# Patient Record
Sex: FEMALE | URBAN - METROPOLITAN AREA
[De-identification: names, ages, dates, MRNs, and addresses within clinical notes are randomized per-mention and may not be internally consistent; named-entity substitution may affect disease eponyms.]

---

## 2019-09-24 ENCOUNTER — EMERGENCY (EMERGENCY)
Facility: HOSPITAL | Age: 24
LOS: 1 days | Discharge: HOME | End: 2019-09-24
Attending: EMERGENCY MEDICINE | Admitting: EMERGENCY MEDICINE
Payer: MEDICAID

## 2019-09-24 VITALS
WEIGHT: 125 LBS | OXYGEN SATURATION: 95 % | HEIGHT: 66 IN | SYSTOLIC BLOOD PRESSURE: 103 MMHG | DIASTOLIC BLOOD PRESSURE: 72 MMHG | HEART RATE: 86 BPM | TEMPERATURE: 99 F | RESPIRATION RATE: 18 BRPM

## 2019-09-24 PROCEDURE — 99283 EMERGENCY DEPT VISIT LOW MDM: CPT

## 2019-09-24 RX ORDER — IBUPROFEN 200 MG
1 TABLET ORAL
Qty: 28 | Refills: 0
Start: 2019-09-24 | End: 2019-09-30

## 2019-09-24 NOTE — ED PROVIDER NOTE - OBJECTIVE STATEMENT
Intermittent R axillary pain for the past 6 days.  Worse at night, mild, intermittent.  Denies radiation of pain.  Denies arm pain, breast pain, fever or chills.  Denies hx of breast cancer in the family.  States that she felt a lump earlier but it is no longer present and she currently does not have any pain.  Accompanied by mom and sister.  Endorses GYN doctor whom she has close follow up with.  Has not taken any medication for pain prior to arrival in ED

## 2019-09-24 NOTE — ED PROVIDER NOTE - PATIENT PORTAL LINK FT
You can access the FollowMyHealth Patient Portal offered by [facility] by registering at the following website: [email or web address]. By joining [employer]’s FollowMyHealth portal, you will also be able to view your health information using other applications (apps) compatible with our system.

## 2019-09-24 NOTE — ED PROVIDER NOTE - NSFOLLOWUPINSTRUCTIONS_ED_ALL_ED_FT
Take motrin/advil 400 mg as needed for pain    If the area becomes more swollen, tender or red please start antibiotics that were prescribed to you    If symptoms persist please follow yup with your GYN doctor or PCP.

## 2019-09-24 NOTE — ED PROVIDER NOTE - CLINICAL SUMMARY MEDICAL DECISION MAKING FREE TEXT BOX
Intermittent R axillary pain for the past 6 days.  No pain present.  No visible or palpable abnormal skin areas on exam.  Denies breast pain or constitutional symptoms.  Denies hx of breast cancer.  Prescribed medication or pain and antibiotics if the symptoms worsen.  No role for imaging or blood work at this time.  Due to episodic nature of the symptoms unlikely lymphadenitis or abscess.  Conservative management discussed with the patient in detail.  Close PMD/GYN follow up encouraged.  Strict ED return instructions discussed in detail and patient given the opportunity to ask any questions about their discharge diagnosis and instructions

## 2019-09-24 NOTE — ED ADULT TRIAGE NOTE - CHIEF COMPLAINT QUOTE
Pt presents to ED with c/o pain/induration to right axilla, denies fevers, no streaking or purulent d/c

## 2019-09-24 NOTE — ED PROVIDER NOTE - PHYSICAL EXAMINATION
R axillary area - short cut hear present.  No erythema or visible areas of abnormal skin.  No TTP or palpable areas present

## 2019-10-01 DIAGNOSIS — M79.621 PAIN IN RIGHT UPPER ARM: ICD-10-CM

## 2020-08-03 ENCOUNTER — EMERGENCY (EMERGENCY)
Facility: HOSPITAL | Age: 25
LOS: 1 days | Discharge: HOME | End: 2020-08-03
Attending: EMERGENCY MEDICINE | Admitting: EMERGENCY MEDICINE
Payer: MEDICAID

## 2020-08-03 VITALS
DIASTOLIC BLOOD PRESSURE: 61 MMHG | OXYGEN SATURATION: 97 % | WEIGHT: 139.99 LBS | SYSTOLIC BLOOD PRESSURE: 95 MMHG | TEMPERATURE: 98 F | HEART RATE: 76 BPM | RESPIRATION RATE: 18 BRPM

## 2020-08-03 PROCEDURE — 99284 EMERGENCY DEPT VISIT MOD MDM: CPT

## 2020-08-03 NOTE — ED ADULT TRIAGE NOTE - CHIEF COMPLAINT QUOTE
states she had a dry cough, nasal congestion on friday- is here for covid test, pt with no medical complaints at this time

## 2020-08-03 NOTE — ED PROVIDER NOTE - OBJECTIVE STATEMENT
23 y/o female presents to the ED with complaints of dry cough and nasal congestion x 3 days. Patient is requesting COVID-19 testing. No other medical complaints at this time. Denies fever or chills. 23 y/o female presents to the ED with complaints of dry cough, mild headache, body aches, and nasal congestion x 3 days. Endorses having seasonal allergies but current symptoms are different from past episodes. Patient is requesting COVID-19 testing. No other medical complaints at this time. Denies any sick contacts. Patient works as a  [place] Denies fever or chills.

## 2020-08-03 NOTE — ED PROVIDER NOTE - NSFOLLOWUPINSTRUCTIONS_ED_ALL_ED_FT
Prevención de infecciones en el hogar  Infection Prevention in the Home  Si tiene claudette infección, puede haberse expuesto a claudette infección o está cuidando a alguien que tiene claudette infección, es importante saber cómo evitar que la infección se propague. Siga las instrucciones del médico y cheo uso de estas pautas para evitar la propagación de la infección.  Cómo se propagan las infecciones  Para que claudette infección se propague, debe existir lo siguiente:  Un germen. Tashua puede ser un virus, claudette bacteria, un hongo o un parásito.Un lugar donde viva el germen. Tashua puede ser lo siguiente:  En claudette persona, animal, planta o alimento.En el suelo o en el agua.En superficies cameron la manija de claudette mony.Claudette persona o animal que pueda desarrollar claudette enfermedad si el germen ingresa en mcleod cuerpo (anfitrión). El anfitrión no tiene resistencia al germen.Claudette manera de que el germen ingrese al anfitrión. Tashua puede ocurrir de las siguientes maneras:  Por contacto directo con claudette persona o animal infectado. Tashua puede suceder al darse la mano o abrazarse. Algunos gérmenes también pueden desplazarse a través del aire y propagarse a otras personas. Tashua puede ocurrir cuando claudette persona infectada tose o estornuda sobre o cerca de otras personas.Contacto indirecto. Tashua sucede cuando el germen ingresa en el anfitrión a través del contacto con un objeto infectado. Por ejemplo:  Ingerir o beber alimentos o agua que tengan el germen (estén contaminados).Al tocar claudette superficie contaminada con las elliot y luego llevarse la mano a la gloria, la boca, la nariz o los ojos.Materiales necesarios:  Jabón.Desinfectante para elliot a base de alcohol.Productos de limpieza estándar.Desinfectantes, cameron lejía.Toallas de papel o paños de limpieza o esponjas reutilizables.Guantes de trabajo desechables o reutilizables. Cómo evitar que la infección se propague  Hay varias cosas que puede hacer para prevenir la propagación de la infección.  [place] estas medidas generales     Todas las personas deben clarice las siguientes medidas para evitar la propagación de la infección:  Lávese las elliot regularmente con agua y jabón joyce al menos 20 segundos. Use desinfectante para elliot con alcohol si no dispone de agua y jabón.Evitar tocarse la gloria, la boca, la nariz y los ojos.Tosa o estornude en un pañuelo de papel o en mcleod manga o codo en lugar de hacerlo en la mano o en el aire.  Si tose o estornuda en un pañuelo de papel, deséchelo inmediatamente y lávese las elliot.Mantenga el baño limpio     Ponga jabón.Cambiar las toallas y las toallitas de mano con frecuencia.Cambiar los cepillos de dientes a menudo y guardarlos por separado en un lugar limpio y seco.Limpie y desinfecte todas las superficies; entre ellas, el inodoro, el piso, la bañera, la ducha y el lavabo.No comparta elementos personales, cameron afeitadoras, cepillos de dientes, desodorantes, peines, cepillos, toallas y toallitas de mano.Mantenga la higiene en la cocina        Lávese las elliot antes y después de preparar los alimentos y antes de comer. Limpiar el interior del refrigerador todas las semanas. Mantener el refrigerador en 40 °F (4 °C) o menos y el congelador a claudette temperatura de 0 °F (–18 °C) o menos. Mantenga las superficies de trabajo limpias. Desinféctelas periódicamente.Gualberto la vajilla con [place] y jabonosa. Secar la vajilla al aire o usar un lavavajillas. No comparta platos ni utensilios para comer.Manipule los alimentos de manera gusman.     Almacene los alimentos cuidadosamente. Refrigere las sobras de inmediato en recipientes con tapa. Tire alimentos rancios o en mal estado. Descongele los alimentos en el refrigerador o el microondas, no a temperatura ambiente. Sirva los alimentos a la temperatura adecuada. No coma carne cruda. Asegúrese de cocinar la carne a la temperatura adecuada. Cocine los huevos hasta que estén firmes. Lave las frutas y las verduras debajo del agua corriente. Use tablas de hieu, platos y utensilios distintos para alimentos crudos y alimentos cocidos. Use claudette cuchara limpia cada vez que prueba la comida mientras cocina.Lave la ropa de la manera correcta     Use guantes si la ropa está visiblemente sucia.No sacuda la ropa sucia. Hacerlo puede esparcir los gérmenes por el aire.Lave la ropa con [place].Si no puede gualberto la ropa de inmediato, colóquela en claudette bolsa plástica y lávela lo antes posible.Tenga cuidado con los animales y mascotas.     Lávese las elliot antes y después de tocar animales.Si tiene claudette mascota, asegúrese de mantenerla limpia. No permita que personas con un sistema inmunitario débil toquen excremento de pájaros, el agua de la pecera ni bandejas sanitarias.  Si tiene claudette jaula para mascotas o claudette caja de arena, asegúrese de limpiarla todos los días.Si está enfermo, manténgase alejado de los animales y pídale a otra persona que los cuide si es posible.Cómo limpiar y desinfectar objetos y superficies  Precauciones     Algunos desinfectantes funcionan con ciertos gérmenes y no con otros. Nohemy las instrucciones del fabricante o nohemy los recursos en línea para determinar si el producto que está usando funcionará con el germen que usted trata de eliminar.Si elige emplear lejía, úsela de manera gusman. Nunca los mezcle con otros productos de limpieza, en especial, si contienen amoníaco. Esta mezcla puede producir un gas peligroso que puede ser mortal.Mantenga un movimiento adecuado de aire fresco en mcleod casa (ventilación).Vierta el agua que usó para trapear en el lavabo de servicio o en el inodoro. No vierta song agua en el fregadero de la cocina.Objetos y superficies        Si las superficies están visiblemente sucias, límpielas alison con agua y jabón antes de desinfectarlas.Desinfecte las superficies que se tocan con frecuencia todos los días. Pueden incluir:  Encimeras.Mesas.Picaportes.Lavabos, fregaderos y grifos.Dispositivos electrónicos, cameron:  Teléfonos.Controles remotos.Teclados.Computadoras y tablets.Suministros de limpieza     Algunos suministros de limpieza pueden cultivar gérmenes. Cuídelos colleen para evitar que los gérmenes se propaguen. Para hacer esto:  Remoje cepillos de inodoro, trapeadores y esponjas en lejía y agua joyce 5 minutos después de cada uso, o según las instrucciones del fabricante.Lave los paños de limpieza reutilizables y desinfecte las esponjas después de cada uso.Deseche los guantes desechables después de un uso.Reemplace los guantes reutilizables si están rotos o rasgados o si se empiezan a pelar.Medidas adicionales si está enfermo  Si vive con otras personas:        Evite el contacto cercano con las personas que lo rodean. Permanezca a claudette distancia de al menos 3 pies (1 m) de las otras personas, si es posible.Use un baño aparte, si es posible. De ser posible, duerma en un dormitorio aparte o en claudette cama aparte para evitar infectar a otros miembros de la lindsey.  Cambiar la ropa jatin de los dormitorios todas las semanas o cuando esté sucia.Cheo que todos los integrantes del hogar se laven las elliot con agua y jabón con frecuencia. Use desinfectante para elliot con alcohol si no dispone de agua y jabón.En general:     Quédese en mcleod casa, excepto para obtener atención médica. Llame con anticipación antes de visitar al médico.Pídales a otras personas que le jaime la compra del supermercado y los suministros para el hogar, y que surtan las recetas de farida medicamentos.Evite las zonas públicas. Trate de no viajar en transporte público.Si puede, use claudette mascarilla si debe salir de la casa o si está en contacto cercano con alguien que no está enfermo.No reciba visitas hasta que se haya recuperado completamente o hasta que no tenga signos ni síntomas de infección.Evite preparar alimentos o cuidar a otras personas. Si debe preparar alimentos o cuidar a otras personas, use claudette mascarilla y lávese las elliot antes y después de hacer estas cosas.Dónde buscar más información  Centers for Disease Control and Prevention (Centros para el Control y la Prevención de Enfermedades): www.cdc.gov/nonpharmaceutical-interventions/index.html.Organización Mundial de la Theodora (OMS): www.who.int/infection-prevention/about/en/Association for Professionals in Infection Control and Epidemiology (Asociación de profesionales del control de infecciones y epidemiología): professionals.site.apic.org/settings-of-care/non-healthcare-setting/home/Resumen  Es importante saber cómo evitar que la infección se propague.Asegúrese de que en mcleod hogar todos se laven las elliot con agua y jabón con frecuencia.Desinfecte las superficies que se tocan con frecuencia todos los días.Si está enfermo, quédese en mcleod casa, excepto para obtener atención médica.Esta información no tiene cameron fin reemplazar el consejo del médico. Asegúrese de hacerle al médico cualquier pregunta que tenga.    Document Released: 11/30/2009 Document Revised: 03/30/2020 Document Reviewed: 03/30/2020  Elsevier Patient Education © 2020 Elsevier Inc.

## 2020-08-03 NOTE — ED PROVIDER NOTE - PATIENT PORTAL LINK FT
[address] [facility]  Brief Op Note     Shawanda Jacobo Location: OR   [facility] [id number] MRN [id number]   Admission Date 3/30/2017 Operation Date 3/30/2017   Attending Physician Sandra Becker., Valdemar Higginbotham DDS Operating Physician Sr Nikky Hartman DDS You can access the FollowMyHealth Patient Portal offered by [facility] by registering at the following website: [email or web address]. By joining [employer]’s FollowMyHealth portal, you will also be able to view your health information using other applications (apps) compatible with our system.

## 2020-08-04 LAB — SARS-COV-2 RNA SPEC QL NAA+PROBE: SIGNIFICANT CHANGE UP

## 2020-08-05 ENCOUNTER — TRANSCRIPTION ENCOUNTER (OUTPATIENT)
Age: 25
End: 2020-08-05

## 2020-08-07 DIAGNOSIS — B34.9 VIRAL INFECTION, UNSPECIFIED: ICD-10-CM

## 2020-08-07 DIAGNOSIS — R05 COUGH: ICD-10-CM

## 2020-08-07 DIAGNOSIS — Z20.828 CONTACT WITH AND (SUSPECTED) EXPOSURE TO OTHER VIRAL COMMUNICABLE DISEASES: ICD-10-CM

## 2020-11-08 ENCOUNTER — EMERGENCY (EMERGENCY)
Facility: HOSPITAL | Age: 25
LOS: 1 days | Discharge: HOME | End: 2020-11-08
Attending: EMERGENCY MEDICINE | Admitting: EMERGENCY MEDICINE
Payer: MEDICAID

## 2020-11-08 VITALS
WEIGHT: 145.06 LBS | OXYGEN SATURATION: 99 % | HEART RATE: 71 BPM | TEMPERATURE: 98 F | RESPIRATION RATE: 16 BRPM | DIASTOLIC BLOOD PRESSURE: 66 MMHG | SYSTOLIC BLOOD PRESSURE: 106 MMHG | HEIGHT: 66 IN

## 2020-11-08 DIAGNOSIS — Z20.828 CONTACT WITH AND (SUSPECTED) EXPOSURE TO OTHER VIRAL COMMUNICABLE DISEASES: ICD-10-CM

## 2020-11-08 PROCEDURE — 99283 EMERGENCY DEPT VISIT LOW MDM: CPT

## 2020-11-08 NOTE — ED PROVIDER NOTE - PATIENT PORTAL LINK FT
You can access the FollowMyHealth Patient Portal offered by [facility] by registering at the following website: [email or web address]. By joining [employer]’s FollowMyHealth portal, you will also be able to view your health information using other applications (apps) compatible with our system.

## 2020-11-08 NOTE — ED PROVIDER NOTE - OBJECTIVE STATEMENT
26 y/o F presents for COVID testing. No active sx. Feeling in normal state of health. No travel or known sick contacts. Denies fever/cough/SOB.

## 2020-11-09 LAB — SARS-COV-2 RNA SPEC QL NAA+PROBE: SIGNIFICANT CHANGE UP

## 2020-11-16 ENCOUNTER — EMERGENCY (EMERGENCY)
Facility: HOSPITAL | Age: 25
LOS: 1 days | Discharge: HOME | End: 2020-11-16
Attending: EMERGENCY MEDICINE | Admitting: EMERGENCY MEDICINE
Payer: MEDICAID

## 2020-11-16 VITALS
SYSTOLIC BLOOD PRESSURE: 128 MMHG | DIASTOLIC BLOOD PRESSURE: 90 MMHG | RESPIRATION RATE: 18 BRPM | OXYGEN SATURATION: 99 % | TEMPERATURE: 98 F | HEART RATE: 86 BPM | WEIGHT: 125 LBS | HEIGHT: 66 IN

## 2020-11-16 VITALS
OXYGEN SATURATION: 96 % | HEART RATE: 68 BPM | SYSTOLIC BLOOD PRESSURE: 101 MMHG | TEMPERATURE: 98 F | RESPIRATION RATE: 18 BRPM | DIASTOLIC BLOOD PRESSURE: 63 MMHG

## 2020-11-16 DIAGNOSIS — R53.1 WEAKNESS: ICD-10-CM

## 2020-11-16 DIAGNOSIS — Z20.828 CONTACT WITH AND (SUSPECTED) EXPOSURE TO OTHER VIRAL COMMUNICABLE DISEASES: ICD-10-CM

## 2020-11-16 DIAGNOSIS — M79.18 MYALGIA, OTHER SITE: ICD-10-CM

## 2020-11-16 DIAGNOSIS — R07.2 PRECORDIAL PAIN: ICD-10-CM

## 2020-11-16 DIAGNOSIS — Z79.899 OTHER LONG TERM (CURRENT) DRUG THERAPY: ICD-10-CM

## 2020-11-16 DIAGNOSIS — Z79.1 LONG TERM (CURRENT) USE OF NON-STEROIDAL ANTI-INFLAMMATORIES (NSAID): ICD-10-CM

## 2020-11-16 LAB
ALBUMIN SERPL ELPH-MCNC: 4.4 G/DL — SIGNIFICANT CHANGE UP (ref 3.4–5)
ALP SERPL-CCNC: 73 U/L — SIGNIFICANT CHANGE UP (ref 40–120)
ALT FLD-CCNC: 13 U/L — SIGNIFICANT CHANGE UP (ref 12–42)
AMPHET UR-MCNC: NEGATIVE — SIGNIFICANT CHANGE UP
ANION GAP SERPL CALC-SCNC: 12 MMOL/L — SIGNIFICANT CHANGE UP (ref 9–16)
APPEARANCE UR: CLEAR — SIGNIFICANT CHANGE UP
AST SERPL-CCNC: 19 U/L — SIGNIFICANT CHANGE UP (ref 15–37)
BACTERIA # UR AUTO: PRESENT /HPF
BARBITURATES UR SCN-MCNC: NEGATIVE — SIGNIFICANT CHANGE UP
BASOPHILS # BLD AUTO: 0.06 K/UL — SIGNIFICANT CHANGE UP (ref 0–0.2)
BASOPHILS NFR BLD AUTO: 0.6 % — SIGNIFICANT CHANGE UP (ref 0–2)
BENZODIAZ UR-MCNC: NEGATIVE — SIGNIFICANT CHANGE UP
BILIRUB SERPL-MCNC: 0.4 MG/DL — SIGNIFICANT CHANGE UP (ref 0.2–1.2)
BILIRUB UR-MCNC: NEGATIVE — SIGNIFICANT CHANGE UP
BUN SERPL-MCNC: 10 MG/DL — SIGNIFICANT CHANGE UP (ref 7–23)
CALCIUM SERPL-MCNC: 9.7 MG/DL — SIGNIFICANT CHANGE UP (ref 8.5–10.5)
CHLORIDE SERPL-SCNC: 104 MMOL/L — SIGNIFICANT CHANGE UP (ref 96–108)
CK SERPL-CCNC: 368 U/L — HIGH (ref 26–192)
CO2 SERPL-SCNC: 25 MMOL/L — SIGNIFICANT CHANGE UP (ref 22–31)
COCAINE METAB.OTHER UR-MCNC: NEGATIVE — SIGNIFICANT CHANGE UP
COLOR SPEC: YELLOW — SIGNIFICANT CHANGE UP
CREAT SERPL-MCNC: 0.73 MG/DL — SIGNIFICANT CHANGE UP (ref 0.5–1.3)
D DIMER BLD IA.RAPID-MCNC: <187 NG/ML DDU — SIGNIFICANT CHANGE UP
DIFF PNL FLD: ABNORMAL
EOSINOPHIL # BLD AUTO: 0.09 K/UL — SIGNIFICANT CHANGE UP (ref 0–0.5)
EOSINOPHIL NFR BLD AUTO: 1 % — SIGNIFICANT CHANGE UP (ref 0–6)
EPI CELLS # UR: SIGNIFICANT CHANGE UP /HPF (ref 0–5)
GLUCOSE SERPL-MCNC: 99 MG/DL — SIGNIFICANT CHANGE UP (ref 70–99)
GLUCOSE UR QL: NEGATIVE — SIGNIFICANT CHANGE UP
HCG SERPL-ACNC: <1 MIU/ML — SIGNIFICANT CHANGE UP
HCT VFR BLD CALC: 38.5 % — SIGNIFICANT CHANGE UP (ref 34.5–45)
HGB BLD-MCNC: 13 G/DL — SIGNIFICANT CHANGE UP (ref 11.5–15.5)
IMM GRANULOCYTES NFR BLD AUTO: 0.5 % — SIGNIFICANT CHANGE UP (ref 0–1.5)
KETONES UR-MCNC: ABNORMAL MG/DL
LACTATE SERPL-SCNC: 1.2 MMOL/L — SIGNIFICANT CHANGE UP (ref 0.4–2)
LACTATE SERPL-SCNC: 2.2 MMOL/L — HIGH (ref 0.4–2)
LEUKOCYTE ESTERASE UR-ACNC: ABNORMAL
LIDOCAIN IGE QN: 88 U/L — SIGNIFICANT CHANGE UP (ref 73–393)
LYMPHOCYTES # BLD AUTO: 1.3 K/UL — SIGNIFICANT CHANGE UP (ref 1–3.3)
LYMPHOCYTES # BLD AUTO: 13.8 % — SIGNIFICANT CHANGE UP (ref 13–44)
MAGNESIUM SERPL-MCNC: 1.9 MG/DL — SIGNIFICANT CHANGE UP (ref 1.6–2.6)
MCHC RBC-ENTMCNC: 29.6 PG — SIGNIFICANT CHANGE UP (ref 27–34)
MCHC RBC-ENTMCNC: 33.8 GM/DL — SIGNIFICANT CHANGE UP (ref 32–36)
MCV RBC AUTO: 87.7 FL — SIGNIFICANT CHANGE UP (ref 80–100)
METHADONE UR-MCNC: NEGATIVE — SIGNIFICANT CHANGE UP
MONOCYTES # BLD AUTO: 0.94 K/UL — HIGH (ref 0–0.9)
MONOCYTES NFR BLD AUTO: 10 % — SIGNIFICANT CHANGE UP (ref 2–14)
NEUTROPHILS # BLD AUTO: 6.95 K/UL — SIGNIFICANT CHANGE UP (ref 1.8–7.4)
NEUTROPHILS NFR BLD AUTO: 74.1 % — SIGNIFICANT CHANGE UP (ref 43–77)
NITRITE UR-MCNC: NEGATIVE — SIGNIFICANT CHANGE UP
NRBC # BLD: 0 /100 WBCS — SIGNIFICANT CHANGE UP (ref 0–0)
NT-PROBNP SERPL-SCNC: 51 PG/ML — SIGNIFICANT CHANGE UP
OPIATES UR-MCNC: NEGATIVE — SIGNIFICANT CHANGE UP
PCP SPEC-MCNC: SIGNIFICANT CHANGE UP
PCP UR-MCNC: NEGATIVE — SIGNIFICANT CHANGE UP
PH UR: 8.5 — HIGH (ref 5–8)
PLATELET # BLD AUTO: 255 K/UL — SIGNIFICANT CHANGE UP (ref 150–400)
POTASSIUM SERPL-MCNC: 3.7 MMOL/L — SIGNIFICANT CHANGE UP (ref 3.5–5.3)
POTASSIUM SERPL-SCNC: 3.7 MMOL/L — SIGNIFICANT CHANGE UP (ref 3.5–5.3)
PROT SERPL-MCNC: 8.1 G/DL — SIGNIFICANT CHANGE UP (ref 6.4–8.2)
PROT UR-MCNC: NEGATIVE MG/DL — SIGNIFICANT CHANGE UP
RBC # BLD: 4.39 M/UL — SIGNIFICANT CHANGE UP (ref 3.8–5.2)
RBC # FLD: 12.1 % — SIGNIFICANT CHANGE UP (ref 10.3–14.5)
RBC CASTS # UR COMP ASSIST: < 5 /HPF — SIGNIFICANT CHANGE UP
SARS-COV-2 RNA SPEC QL NAA+PROBE: SIGNIFICANT CHANGE UP
SODIUM SERPL-SCNC: 141 MMOL/L — SIGNIFICANT CHANGE UP (ref 132–145)
SP GR SPEC: 1.01 — SIGNIFICANT CHANGE UP (ref 1–1.03)
THC UR QL: POSITIVE
TROPONIN I SERPL-MCNC: <0.017 NG/ML — LOW (ref 0.02–0.06)
TROPONIN I SERPL-MCNC: <0.017 NG/ML — LOW (ref 0.02–0.06)
TSH SERPL-MCNC: 0.83 UIU/ML — SIGNIFICANT CHANGE UP (ref 0.36–3.74)
UROBILINOGEN FLD QL: 0.2 E.U./DL — SIGNIFICANT CHANGE UP
WBC # BLD: 9.39 K/UL — SIGNIFICANT CHANGE UP (ref 3.8–10.5)
WBC # FLD AUTO: 9.39 K/UL — SIGNIFICANT CHANGE UP (ref 3.8–10.5)
WBC UR QL: < 5 /HPF — SIGNIFICANT CHANGE UP

## 2020-11-16 PROCEDURE — 74174 CTA ABD&PLVS W/CONTRAST: CPT | Mod: 26

## 2020-11-16 PROCEDURE — 93010 ELECTROCARDIOGRAM REPORT: CPT

## 2020-11-16 PROCEDURE — 71045 X-RAY EXAM CHEST 1 VIEW: CPT | Mod: 26

## 2020-11-16 PROCEDURE — 99285 EMERGENCY DEPT VISIT HI MDM: CPT

## 2020-11-16 PROCEDURE — 71275 CT ANGIOGRAPHY CHEST: CPT | Mod: 26

## 2020-11-16 RX ORDER — SODIUM CHLORIDE 9 MG/ML
2000 INJECTION INTRAMUSCULAR; INTRAVENOUS; SUBCUTANEOUS ONCE
Refills: 0 | Status: COMPLETED | OUTPATIENT
Start: 2020-11-16 | End: 2020-11-16

## 2020-11-16 RX ORDER — METHOCARBAMOL 500 MG/1
750 TABLET, FILM COATED ORAL ONCE
Refills: 0 | Status: COMPLETED | OUTPATIENT
Start: 2020-11-16 | End: 2020-11-16

## 2020-11-16 RX ORDER — ACETAMINOPHEN 500 MG
650 TABLET ORAL ONCE
Refills: 0 | Status: COMPLETED | OUTPATIENT
Start: 2020-11-16 | End: 2020-11-16

## 2020-11-16 RX ADMIN — SODIUM CHLORIDE 2000 MILLILITER(S): 9 INJECTION INTRAMUSCULAR; INTRAVENOUS; SUBCUTANEOUS at 19:34

## 2020-11-16 RX ADMIN — METHOCARBAMOL 750 MILLIGRAM(S): 500 TABLET, FILM COATED ORAL at 18:43

## 2020-11-16 RX ADMIN — Medication 650 MILLIGRAM(S): at 18:21

## 2020-11-16 NOTE — ED PROVIDER NOTE - PROGRESS NOTE DETAILS
normal trop x 2, EKG non ischemic, lactic normalized, improved symptoms w hydration, chest pain free now. CTA prelim neg. Likely to DC when finalized CTA result w Neuro and Cardio F/U/.

## 2020-11-16 NOTE — ED PROVIDER NOTE - CLINICAL SUMMARY MEDICAL DECISION MAKING FREE TEXT BOX
Pt feeling unwell, hard to describe, some pressure sensation in the chest, nausea, yesterday, will get work up, hydrate and treat supportively. Will reassess.

## 2020-11-16 NOTE — ED PROVIDER NOTE - OBJECTIVE STATEMENT
26 yo female pt, no hx of med problems, nkda. Presents w substernal chest pain, non radiating. Yesterday was feeling well after a recent trip to [place] and returned yesterday. The today felt unwell, malaise, aches, generalized weakness. States when she was in [place] had an ED evaluation for new onset seizure episode, unclear circumstances, had a normal HCT and labs and instructed to F/U w  Neuro when she returned to [place]. Today has no HA, no neck pain, no fever, no sob, no cough,  no abd pain, no vomiting, no dysuria.

## 2020-11-16 NOTE — ED ADULT NURSE NOTE - OBJECTIVE STATEMENT
26 y/o female who states she had a seizure while visiting in [place] last friday- pt denies drugs, alcohol or any medical history. Pt was seen abd treated in [place]- pt arrived yesterday reporting chest pressure and sob. Pt is extremely anxious upon assessment. Iv placed, labs drawn and sent- Vitals stable

## 2020-11-16 NOTE — ED PROVIDER NOTE - CARE PROVIDERS DIRECT ADDRESSES
[email or web address]

## 2020-11-16 NOTE — ED PROVIDER NOTE - NSPTACCESSSVCSAPPTDETAILS_ED_ALL_ED_FT
Neurology follow up for recent new onset seizure episode    Cardio follow up for chest pain work up.

## 2020-11-16 NOTE — ED ADULT NURSE NOTE - CHPI ED NUR SYMPTOMS NEG
no decreased eating/drinking/no dizziness/no fever/no pain/no chills/no nausea/no tingling/no vomiting/no weakness

## 2020-11-16 NOTE — ED PROVIDER NOTE - PATIENT PORTAL LINK FT
You can access the FollowMyHealth Patient Portal offered by [facility] by registering at the following website: [email or web address]. By joining [employer]’s FollowMyHealth portal, you will also be able to view your health information using other applications (apps) compatible with our system.

## 2020-11-16 NOTE — ED PROVIDER NOTE - CARE PROVIDER_API CALL
Benjy Colon  CARDIOVASCULAR DISEASE  [address] [place] [address]  Phone: [phone]  Fax: [phone]  Follow Up Time:     Jasbir Pink  NEUROLOGY  [address], [address] [place] [id number]  Phone: [phone]  Fax: [phone]  Follow Up Time:

## 2020-11-17 LAB
CULTURE RESULTS: SIGNIFICANT CHANGE UP
SPECIMEN SOURCE: SIGNIFICANT CHANGE UP

## 2020-11-20 ENCOUNTER — APPOINTMENT (OUTPATIENT)
Age: 25
End: 2020-11-20
Payer: MEDICAID

## 2020-11-20 PROCEDURE — 99204 OFFICE O/P NEW MOD 45 MIN: CPT

## 2020-11-20 NOTE — REASON FOR VISIT
[Initial Evaluation] : an initial evaluation of [Chest Pain] : chest pain [FreeTextEntry1] : 24 yo female patient presents w substernal chest pain, non radiating. Yesterday was feeling well\par after a recent trip to [place] and returned yesterday. The today felt unwell, malaise, aches, generalized weakness. States when she was in [place] had an ED evaluation for new onset seizure episode, unclear circumstances, had a normal HCT and labs and instructed to F/U w Neuro when she returned to [place]. Today has no HA, no neck pain, no fever, no sob, no cough, no abd pain, no vomiting, no\par dysuria.\par

## 2020-11-20 NOTE — DISCUSSION/SUMMARY
[FreeTextEntry1] : CP/APONTE will have her come back for echocardiogram, ekg next visit if staff is available.\par Refer to Neuro and Primary Care

## 2020-11-23 ENCOUNTER — APPOINTMENT (OUTPATIENT)
Dept: INTERNAL MEDICINE | Facility: CLINIC | Age: 25
End: 2020-11-23
Payer: MEDICAID

## 2020-11-23 ENCOUNTER — NON-APPOINTMENT (OUTPATIENT)
Age: 25
End: 2020-11-23

## 2020-11-23 ENCOUNTER — LABORATORY RESULT (OUTPATIENT)
Age: 25
End: 2020-11-23

## 2020-11-23 VITALS
HEIGHT: 66 IN | HEART RATE: 91 BPM | BODY MASS INDEX: 22.5 KG/M2 | TEMPERATURE: 98.1 F | SYSTOLIC BLOOD PRESSURE: 107 MMHG | DIASTOLIC BLOOD PRESSURE: 66 MMHG | OXYGEN SATURATION: 99 % | WEIGHT: 140 LBS

## 2020-11-23 DIAGNOSIS — Z87.01 PERSONAL HISTORY OF PNEUMONIA (RECURRENT): ICD-10-CM

## 2020-11-23 DIAGNOSIS — Z87.19 PERSONAL HISTORY OF OTHER DISEASES OF THE DIGESTIVE SYSTEM: ICD-10-CM

## 2020-11-23 DIAGNOSIS — Z23 ENCOUNTER FOR IMMUNIZATION: ICD-10-CM

## 2020-11-23 DIAGNOSIS — Z87.898 PERSONAL HISTORY OF OTHER SPECIFIED CONDITIONS: ICD-10-CM

## 2020-11-23 DIAGNOSIS — Z83.3 FAMILY HISTORY OF DIABETES MELLITUS: ICD-10-CM

## 2020-11-23 PROCEDURE — 99495 TRANSJ CARE MGMT MOD F2F 14D: CPT | Mod: 25

## 2020-11-23 PROCEDURE — 99385 PREV VISIT NEW AGE 18-39: CPT | Mod: 25

## 2020-11-23 PROCEDURE — 99213 OFFICE O/P EST LOW 20 MIN: CPT | Mod: 25

## 2020-11-23 PROCEDURE — G0444 DEPRESSION SCREEN ANNUAL: CPT

## 2020-11-23 PROCEDURE — 93000 ELECTROCARDIOGRAM COMPLETE: CPT | Mod: 59

## 2020-11-23 PROCEDURE — 36415 COLL VENOUS BLD VENIPUNCTURE: CPT

## 2020-11-23 NOTE — REVIEW OF SYSTEMS
[Chest Pain] : chest pain [Shortness Of Breath] : shortness of breath [Negative] : Heme/Lymph [Fever] : no fever [Vision Problems] : no vision problems [Hearing Loss] : no hearing loss [Palpitations] : no palpitations

## 2020-11-23 NOTE — HEALTH RISK ASSESSMENT
[No] : In the past 12 months have you used drugs other than those required for medical reasons? No [No falls in past year] : Patient reported no falls in the past year [0] : 2) Feeling down, depressed, or hopeless: Not at all (0) [No Retinopathy] : No retinopathy [Patient reported PAP Smear was normal] : Patient reported PAP Smear was normal [HIV test declined] : HIV test declined [Hepatitis C test declined] : Hepatitis C test declined [With Family] : lives with family [Employed] : employed [Single] : single [Fully functional (bathing, dressing, toileting, transferring, walking, feeding)] : Fully functional (bathing, dressing, toileting, transferring, walking, feeding) [Fully functional (using the telephone, shopping, preparing meals, housekeeping, doing laundry, using] : Fully functional and needs no help or supervision to perform IADLs (using the telephone, shopping, preparing meals, housekeeping, doing laundry, using transportation, managing medications and managing finances) [With Patient/Caregiver] : With Patient/Caregiver [Designated Healthcare Proxy] : Designated healthcare proxy [Name: ___] : Health Care Proxy's Name: [unfilled]  [Relationship: ___] : Relationship: [unfilled] [] : No [Audit-CScore] : 0 [de-identified] : walking [[id number]] : 0 [EyeExamDate] : 12/1/2019 [Sexually Active] : not sexually active [Reports changes in hearing] : Reports no changes in hearing [Reports changes in vision] : Reports no changes in vision [Reports changes in dental health] : Reports no changes in dental health [PapSmearDate] : 8/1/2020 [de-identified] : student  [AdvancecareDate] : 11/23/2020

## 2020-11-23 NOTE — HEALTH RISK ASSESSMENT
[No] : In the past 12 months have you used drugs other than those required for medical reasons? No [No falls in past year] : Patient reported no falls in the past year [0] : 2) Feeling down, depressed, or hopeless: Not at all (0) [No Retinopathy] : No retinopathy [Patient reported PAP Smear was normal] : Patient reported PAP Smear was normal [HIV test declined] : HIV test declined [Hepatitis C test declined] : Hepatitis C test declined [With Family] : lives with family [Employed] : employed [Single] : single [Fully functional (bathing, dressing, toileting, transferring, walking, feeding)] : Fully functional (bathing, dressing, toileting, transferring, walking, feeding) [Fully functional (using the telephone, shopping, preparing meals, housekeeping, doing laundry, using] : Fully functional and needs no help or supervision to perform IADLs (using the telephone, shopping, preparing meals, housekeeping, doing laundry, using transportation, managing medications and managing finances) [With Patient/Caregiver] : With Patient/Caregiver [Designated Healthcare Proxy] : Designated healthcare proxy [Name: ___] : Health Care Proxy's Name: [unfilled]  [Relationship: ___] : Relationship: [unfilled] [] : No [Audit-CScore] : 0 [de-identified] : walking [[id number]] : 0 [EyeExamDate] : 12/1/2019 [Sexually Active] : not sexually active [Reports changes in hearing] : Reports no changes in hearing [Reports changes in vision] : Reports no changes in vision [Reports changes in dental health] : Reports no changes in dental health [PapSmearDate] : 8/1/2020 [de-identified] : student  [AdvancecareDate] : 11/23/2020

## 2020-11-23 NOTE — PHYSICAL EXAM
[No Acute Distress] : no acute distress [Normal Sclera/Conjunctiva] : normal sclera/conjunctiva [Normal Oropharynx] : the oropharynx was normal [No Lymphadenopathy] : no lymphadenopathy [Clear to Auscultation] : lungs were clear to auscultation bilaterally [Normal Rate] : normal rate  [Regular Rhythm] : with a regular rhythm [Pedal Pulses Present] : the pedal pulses are present [No Edema] : there was no peripheral edema [Declined Breast Exam] : declined breast exam  [Soft] : abdomen soft [Non Tender] : non-tender [No CVA Tenderness] : no CVA  tenderness [No Rash] : no rash [Normal] : affect was normal and insight and judgment were intact

## 2020-11-23 NOTE — HISTORY OF PRESENT ILLNESS
[FreeTextEntry1] : 1. Pt presents to establish Primary Care and is requesting an Annual Physical.\par 2. F/u ER [de-identified] : Interview conducted in Canadian and English\par \par c/o F/u ER\par 11/14 while visiting family in [place], was smoking marijuana w some friends -  friends witnessed pt foaming at the mouth and twitching x 2 minutes- pt confused when episode ended - taken to ER - Head CTT/labs negative - given "shot" to prevent sz - advised to f/u Neuro\par 11/15 returned to [place]- a lot turbulence on plane - vomited several times- no HA\par 11/16 next morning awakened w CP/SOB - seen in ER - CTT chest/Abd negative - drug screen + THC - dx anxiety and ref to Cardio \par 11/20 saw Cardio- awaiting ECHO\par no recurrence of sx\par no HA\par no blurred vision/slurred speech/disturbed gait/m/s changes\par no new medications/change dose\par no h/o head trauma\par no h/o drug addiction/drug withdrawal\par no h/o DM, CT disease, MS\par admits to regular marijuana use- no ETOH/crack/cocaine \par feels back to normal \par OTC none\par work student

## 2020-11-23 NOTE — HISTORY OF PRESENT ILLNESS
[FreeTextEntry1] : 1. Pt presents to establish Primary Care and is requesting an Annual Physical.\par 2. F/u ER [de-identified] : Interview conducted in Bangladeshi and English\par \par c/o F/u ER\par 11/14 while visiting family in [place], was smoking marijuana w some friends -  friends witnessed pt foaming at the mouth and twitching x 2 minutes- pt confused when episode ended - taken to ER - Head CTT/labs negative - given "shot" to prevent sz - advised to f/u Neuro\par 11/15 returned to [place]- a lot turbulence on plane - vomited several times- no HA\par 11/16 next morning awakened w CP/SOB - seen in ER - CTT chest/Abd negative - drug screen + THC - dx anxiety and ref to Cardio \par 11/20 saw Cardio- awaiting ECHO\par no recurrence of sx\par no HA\par no blurred vision/slurred speech/disturbed gait/m/s changes\par no new medications/change dose\par no h/o head trauma\par no h/o drug addiction/drug withdrawal\par no h/o DM, CT disease, MS\par admits to regular marijuana use- no ETOH/crack/cocaine \par feels back to normal \par OTC none\par work student

## 2020-11-24 LAB
25(OH)D3 SERPL-MCNC: 51.3 NG/ML
ALBUMIN SERPL ELPH-MCNC: 4.5 G/DL
ALP BLD-CCNC: 68 U/L
ALT SERPL-CCNC: 7 U/L
ANION GAP SERPL CALC-SCNC: 9 MMOL/L
APPEARANCE: ABNORMAL
AST SERPL-CCNC: 15 U/L
BASOPHILS # BLD AUTO: 0.06 K/UL
BASOPHILS NFR BLD AUTO: 1.1 %
BILIRUB SERPL-MCNC: 0.2 MG/DL
BILIRUBIN URINE: NEGATIVE
BLOOD URINE: NORMAL
BUN SERPL-MCNC: 10 MG/DL
CALCIUM SERPL-MCNC: 9.5 MG/DL
CHLORIDE SERPL-SCNC: 105 MMOL/L
CHOLEST SERPL-MCNC: 135 MG/DL
CO2 SERPL-SCNC: 26 MMOL/L
COLOR: YELLOW
CREAT SERPL-MCNC: 0.64 MG/DL
EOSINOPHIL # BLD AUTO: 0.26 K/UL
EOSINOPHIL NFR BLD AUTO: 4.6 %
ERYTHROCYTE [SEDIMENTATION RATE] IN BLOOD BY WESTERGREN METHOD: 9 MM/HR
ESTIMATED AVERAGE GLUCOSE: 97 MG/DL
FOLATE SERPL-MCNC: 9.9 NG/ML
GLUCOSE QUALITATIVE U: NEGATIVE
GLUCOSE SERPL-MCNC: 91 MG/DL
HBA1C MFR BLD HPLC: 5 %
HCT VFR BLD CALC: 38 %
HDLC SERPL-MCNC: 56 MG/DL
HGB BLD-MCNC: 12.2 G/DL
IMM GRANULOCYTES NFR BLD AUTO: 0.5 %
KETONES URINE: NEGATIVE
LDLC SERPL CALC-MCNC: 65 MG/DL
LEUKOCYTE ESTERASE URINE: NEGATIVE
LYMPHOCYTES # BLD AUTO: 1.52 K/UL
LYMPHOCYTES NFR BLD AUTO: 26.9 %
MAN DIFF?: NORMAL
MCHC RBC-ENTMCNC: 29.2 PG
MCHC RBC-ENTMCNC: 32.1 GM/DL
MCV RBC AUTO: 90.9 FL
MONOCYTES # BLD AUTO: 0.71 K/UL
MONOCYTES NFR BLD AUTO: 12.5 %
NEUTROPHILS # BLD AUTO: 3.08 K/UL
NEUTROPHILS NFR BLD AUTO: 54.4 %
NITRITE URINE: NEGATIVE
NONHDLC SERPL-MCNC: 79 MG/DL
PH URINE: 6
PLATELET # BLD AUTO: 256 K/UL
POTASSIUM SERPL-SCNC: 4.2 MMOL/L
PROT SERPL-MCNC: 6.8 G/DL
PROTEIN URINE: ABNORMAL
RBC # BLD: 4.18 M/UL
RBC # FLD: 12.8 %
SODIUM SERPL-SCNC: 141 MMOL/L
SPECIFIC GRAVITY URINE: >=1.03
T PALLIDUM AB SER QL IA: NEGATIVE
TRIGL SERPL-MCNC: 70 MG/DL
TSH SERPL-ACNC: 1.6 UIU/ML
UROBILINOGEN URINE: NORMAL
VIT B12 SERPL-MCNC: 656 PG/ML
WBC # FLD AUTO: 5.66 K/UL

## 2020-11-25 ENCOUNTER — APPOINTMENT (OUTPATIENT)
Dept: HEART AND VASCULAR | Facility: CLINIC | Age: 25
End: 2020-11-25
Payer: MEDICAID

## 2020-11-25 VITALS
HEART RATE: 69 BPM | SYSTOLIC BLOOD PRESSURE: 118 MMHG | BODY MASS INDEX: 22.5 KG/M2 | HEIGHT: 66 IN | DIASTOLIC BLOOD PRESSURE: 79 MMHG | OXYGEN SATURATION: 97 % | WEIGHT: 140 LBS

## 2020-11-25 LAB — ANA SER IF-ACNC: NEGATIVE

## 2020-11-25 PROCEDURE — 99214 OFFICE O/P EST MOD 30 MIN: CPT

## 2020-11-25 PROCEDURE — 93306 TTE W/DOPPLER COMPLETE: CPT

## 2020-11-25 NOTE — REASON FOR VISIT
[Follow-Up - Clinic] : a clinic follow-up of [Dyspnea] : dyspnea [Fatigue] : feeling tired (fatigue) [FreeTextEntry1] : 26 yo female patient presents w substernal chest pain, non radiating. Yesterday was feeling well\par after a recent trip to [place] and returned yesterday. The today felt unwell, malaise, aches, generalized weakness. States when she was in [place] had an ED evaluation for new onset seizure episode, unclear circumstances, had a normal HCT and labs and instructed to F/U w Neuro when she returned to [place]. Today has no HA, no neck pain, no fever, no sob, no cough, no abd pain, no vomiting, no\par dysuria.\par

## 2020-11-25 NOTE — DISCUSSION/SUMMARY
[FreeTextEntry1] : CP/APONTE echo and labs reviewed. will bring back for a stress ekg and re-evaluate. Emotional support provided. Neuro evaluation pending \par

## 2020-12-02 DIAGNOSIS — R82.998 OTHER ABNORMAL FINDINGS IN URINE: ICD-10-CM

## 2020-12-02 LAB
AMPHET UR-MCNC: NEGATIVE
BARBITURATES UR-MCNC: NEGATIVE
BENZODIAZ UR-MCNC: NEGATIVE
COCAINE METAB.OTHER UR-MCNC: NEGATIVE
CREATININE, URINE: 261.2 MG/DL
METHADONE UR-MCNC: NEGATIVE
METHAQUALONE UR-MCNC: NEGATIVE
OPIATES UR-MCNC: NEGATIVE
PCP UR-MCNC: NEGATIVE
PROPOXYPH UR QL: NEGATIVE
THC UR QL: NORMAL

## 2020-12-07 ENCOUNTER — NON-APPOINTMENT (OUTPATIENT)
Age: 25
End: 2020-12-07

## 2020-12-08 ENCOUNTER — NON-APPOINTMENT (OUTPATIENT)
Age: 25
End: 2020-12-08

## 2020-12-14 ENCOUNTER — APPOINTMENT (OUTPATIENT)
Dept: HEART AND VASCULAR | Facility: CLINIC | Age: 25
End: 2020-12-14
Payer: MEDICAID

## 2020-12-14 PROCEDURE — 99072 ADDL SUPL MATRL&STAF TM PHE: CPT

## 2020-12-14 PROCEDURE — 93015 CV STRESS TEST SUPVJ I&R: CPT

## 2020-12-14 PROCEDURE — 99214 OFFICE O/P EST MOD 30 MIN: CPT | Mod: 25

## 2020-12-14 NOTE — REASON FOR VISIT
[Follow-Up - Clinic] : a clinic follow-up of [Chest Pain] : chest pain [FreeTextEntry1] : 24 yo female patient presents w substernal chest pain, non radiating. Yesterday was feeling well\par after a recent trip to [place] and returned yesterday. The today felt unwell, malaise, aches, generalized weakness. States when she was in [place] had an ED evaluation for new onset seizure episode, unclear circumstances, had a normal HCT and labs and instructed to F/U w Neuro when she returned to [place]. Today has no HA, no neck pain, no fever, no sob, no cough, no abd pain, no vomiting, no\par dysuria. She completed an echo and a stress ekg. \par

## 2020-12-14 NOTE — DISCUSSION/SUMMARY
[FreeTextEntry1] : CP/APONTE results discussed with the patient. Inclined towards a conservative follow up in this patient. We had a careful discussion regarding diet and exercise. Will be happy to re-evaluate.\par

## 2020-12-18 ENCOUNTER — APPOINTMENT (OUTPATIENT)
Dept: DERMATOLOGY | Facility: CLINIC | Age: 25
End: 2020-12-18
Payer: MEDICAID

## 2020-12-18 VITALS — SYSTOLIC BLOOD PRESSURE: 109 MMHG | DIASTOLIC BLOOD PRESSURE: 74 MMHG | TEMPERATURE: 98.2 F

## 2020-12-18 DIAGNOSIS — L70.0 ACNE VULGARIS: ICD-10-CM

## 2020-12-18 DIAGNOSIS — L21.9 SEBORRHEIC DERMATITIS, UNSPECIFIED: ICD-10-CM

## 2020-12-18 DIAGNOSIS — L65.0 TELOGEN EFFLUVIUM: ICD-10-CM

## 2020-12-18 PROCEDURE — 99072 ADDL SUPL MATRL&STAF TM PHE: CPT

## 2020-12-18 PROCEDURE — 99203 OFFICE O/P NEW LOW 30 MIN: CPT

## 2020-12-22 ENCOUNTER — APPOINTMENT (OUTPATIENT)
Dept: NEUROLOGY | Facility: CLINIC | Age: 25
End: 2020-12-22
Payer: MEDICAID

## 2020-12-22 VITALS
DIASTOLIC BLOOD PRESSURE: 73 MMHG | SYSTOLIC BLOOD PRESSURE: 108 MMHG | HEIGHT: 66 IN | BODY MASS INDEX: 22.18 KG/M2 | WEIGHT: 138 LBS | OXYGEN SATURATION: 98 % | TEMPERATURE: 97.3 F | RESPIRATION RATE: 16 BRPM | HEART RATE: 94 BPM

## 2020-12-22 PROCEDURE — 99072 ADDL SUPL MATRL&STAF TM PHE: CPT

## 2020-12-22 PROCEDURE — 99204 OFFICE O/P NEW MOD 45 MIN: CPT

## 2020-12-22 NOTE — CONSULT LETTER
[Dear  ___] : Dear  [unfilled], [Consult Letter:] : I had the pleasure of evaluating your patient, [unfilled]. [Please see my note below.] : Please see my note below. [Consult Closing:] : Thank you very much for allowing me to participate in the care of this patient.  If you have any questions, please do not hesitate to contact me. [Sincerely,] : Sincerely, [FreeTextEntry3] : Jasbir Pink M.D.\par Neurology, Electromyography and Neuromuscular Medicine\par [facility]\par \par  of Neurology\par [facility] [place] [facility] [employer]

## 2020-12-22 NOTE — ASSESSMENT
[FreeTextEntry1] : Possible seizure\par Neurologic exam normal\par Ordered MRI Brain (Epilepsy protocol), EEG\par Counseled on avoiding driving at least until workup complete, and if she is diagnosed with epilepsy then needs to be 1 year seizure-free before driving again\par Return after testing

## 2020-12-22 NOTE — PHYSICAL EXAM
[FreeTextEntry1] : Gen: appears well, well-nourished, no acute distress\par \par MS: awake, alert, oriented, speech fluent, comprehension intact, good fund of knowledge, recent and remote memory intact, attention intact\par \par CN: PERRL, EOMI, visual fields full, facial strength and sensation intact and symmetric, hearing grossly intact, palate elevation symmetric, tongue midline, no tongue atrophy or fasciculations, shoulder shrug intact and symmetric\par \par Motor: normal bulk and tone, 5/5 strength throughout, no abnormal movements\par \par Sensory: light touch intact and symmetric throughout\par \par Reflexes: 2+ symmetric throughout, no Le’s sign, plantar responses flexor bilaterally\par \par Coordination: no dysmetria on finger to nose, Romberg negative\par \par Gait: normal, can tandem without difficulty\par \par CV: 2+ pulses b/l, no edema\par \par Ophtho: fundi not visualized

## 2020-12-22 NOTE — HISTORY OF PRESENT ILLNESS
[FreeTextEntry1] : CC: loss of consciousness\par \par HPI: 25 year old woman referred by Dr. Khan for episode of loss of consciousness, occurred once about a month ago, lasted about 2 minutes, she came to when they were puotting her in an ambulance. This occurred while in [place]. She had a CT of the head which was reportedly normal. \par She also went to the ER at [facility] also about a month ago for chest pain and SOB and was told it was due to anxiety \par \par Data reviewed:\par Labs: CORIN, RPR, B12, folate, TSH normal \par Imaging (reports): CT chest -unremarkable \par Prior records: reviewed notes from cardiology and medicine; d/c summary \par Other: stress test normal \par \par ROS: 13 pt review of systems performed and reviewed with patient (General, Eyes, Ears, Cardiovascular, Respiratory, Gastrointestinal, Genitourinary, Musculoskeletal, Skin, Endocrine, Hematologic, Psychiatric, Neurologic)\par Past medical history, surgical history, social history, and family history reviewed with patient\par See scanned document for details

## 2021-01-06 ENCOUNTER — APPOINTMENT (OUTPATIENT)
Dept: NEUROLOGY | Facility: CLINIC | Age: 26
End: 2021-01-06
Payer: MEDICAID

## 2021-01-06 PROCEDURE — 95819 EEG AWAKE AND ASLEEP: CPT

## 2021-01-06 PROCEDURE — 99072 ADDL SUPL MATRL&STAF TM PHE: CPT

## 2021-01-08 ENCOUNTER — APPOINTMENT (OUTPATIENT)
Dept: NEUROLOGY | Facility: CLINIC | Age: 26
End: 2021-01-08

## 2021-04-12 ENCOUNTER — APPOINTMENT (OUTPATIENT)
Dept: HEART AND VASCULAR | Facility: CLINIC | Age: 26
End: 2021-04-12

## 2021-12-02 ENCOUNTER — APPOINTMENT (OUTPATIENT)
Dept: INTERNAL MEDICINE | Facility: CLINIC | Age: 26
End: 2021-12-02
Payer: MEDICAID

## 2021-12-02 VITALS
HEART RATE: 87 BPM | HEIGHT: 66 IN | BODY MASS INDEX: 22.34 KG/M2 | TEMPERATURE: 97.6 F | OXYGEN SATURATION: 99 % | DIASTOLIC BLOOD PRESSURE: 69 MMHG | WEIGHT: 139 LBS | SYSTOLIC BLOOD PRESSURE: 100 MMHG

## 2021-12-02 DIAGNOSIS — R06.00 DYSPNEA, UNSPECIFIED: ICD-10-CM

## 2021-12-02 DIAGNOSIS — Z34.92 ENCOUNTER FOR SUPERVISION OF NORMAL PREGNANCY, UNSPECIFIED, SECOND TRIMESTER: ICD-10-CM

## 2021-12-02 DIAGNOSIS — R56.9 UNSPECIFIED CONVULSIONS: ICD-10-CM

## 2021-12-02 DIAGNOSIS — Z87.898 PERSONAL HISTORY OF OTHER SPECIFIED CONDITIONS: ICD-10-CM

## 2021-12-02 PROCEDURE — 99072 ADDL SUPL MATRL&STAF TM PHE: CPT

## 2021-12-02 PROCEDURE — 99395 PREV VISIT EST AGE 18-39: CPT | Mod: 25

## 2021-12-02 RX ORDER — KETOCONAZOLE 20.5 MG/ML
2 SHAMPOO, SUSPENSION TOPICAL
Qty: 1 | Refills: 7 | Status: DISCONTINUED | COMMUNITY
Start: 2020-12-18 | End: 2021-12-02

## 2021-12-02 RX ORDER — TRETINOIN 0.25 MG/G
0.03 CREAM TOPICAL
Qty: 1 | Refills: 3 | Status: DISCONTINUED | COMMUNITY
Start: 2020-12-18 | End: 2021-12-02

## 2021-12-02 NOTE — HISTORY OF PRESENT ILLNESS
[FreeTextEntry1] : 1. Patient 4 months pregnant- presents today for Annual Physical\par 2. declines COVID vaccine \par

## 2021-12-02 NOTE — PHYSICAL EXAM
[No Acute Distress] : no acute distress [Normal Sclera/Conjunctiva] : normal sclera/conjunctiva [No Lymphadenopathy] : no lymphadenopathy [Clear to Auscultation] : lungs were clear to auscultation bilaterally [Normal Rate] : normal rate  [Regular Rhythm] : with a regular rhythm [Pedal Pulses Present] : the pedal pulses are present [No Edema] : there was no peripheral edema [Declined Breast Exam] : declined breast exam  [Soft] : abdomen soft [Non Tender] : non-tender [Normal Supraclavicular Nodes] : no supraclavicular lymphadenopathy [Normal Axillary Nodes] : no axillary lymphadenopathy [Normal Posterior Cervical Nodes] : no posterior cervical lymphadenopathy [Normal Anterior Cervical Nodes] : no anterior cervical lymphadenopathy [No CVA Tenderness] : no CVA  tenderness [No Rash] : no rash [Normal] : affect was normal and insight and judgment were intact [de-identified] : distended abdomen

## 2021-12-02 NOTE — PAST MEDICAL HISTORY
[Menstruating] : menstruating [Definite ___ (Date)] : the last menstrual period was [unfilled] [de-identified] : pregnant

## 2021-12-02 NOTE — HEALTH RISK ASSESSMENT
[No] : In the past 12 months have you used drugs other than those required for medical reasons? No [No falls in past year] : Patient reported no falls in the past year [0] : 2) Feeling down, depressed, or hopeless: Not at all (0) [PHQ-2 Negative - No further assessment needed] : PHQ-2 Negative - No further assessment needed [Patient reported mammogram was normal] : Patient reported mammogram was normal [HIV test declined] : HIV test declined [Hepatitis C test declined] : Hepatitis C test declined [With Significant Other] : lives with significant other [Student] : student [Significant Other] : lives with significant other [Feels Safe at Home] : Feels safe at home [Fully functional (bathing, dressing, toileting, transferring, walking, feeding)] : Fully functional (bathing, dressing, toileting, transferring, walking, feeding) [Fully functional (using the telephone, shopping, preparing meals, housekeeping, doing laundry, using] : Fully functional and needs no help or supervision to perform IADLs (using the telephone, shopping, preparing meals, housekeeping, doing laundry, using transportation, managing medications and managing finances) [Smoke Detector] : smoke detector [Carbon Monoxide Detector] : carbon monoxide detector [Seat Belt] :  uses seat belt [Sunscreen] : uses sunscreen [With Patient/Caregiver] : , with patient/caregiver [Reviewed no changes] : Reviewed, no changes [I will adhere to the patient's wishes.] : I will adhere to the patient's wishes. [Time Spent: ___ minutes] : Time Spent: [unfilled] minutes [VAB8Phdel] : 0 [EyeExamDate] : 1/1/2021 [Reports changes in hearing] : Reports no changes in hearing [Reports changes in vision] : Reports no changes in vision [Reports changes in dental health] : Reports no changes in dental health [Guns at Home] : no guns at home [PapSmearDate] : 10/2021 [AdvancecareDate] : 12/2/2021

## 2023-06-30 PROBLEM — Z11.3 SCREEN FOR STD (SEXUALLY TRANSMITTED DISEASE): Status: ACTIVE | Noted: 2023-06-30

## 2023-07-05 ENCOUNTER — NON-APPOINTMENT (OUTPATIENT)
Age: 28
End: 2023-07-05

## 2023-07-05 ENCOUNTER — APPOINTMENT (OUTPATIENT)
Dept: INTERNAL MEDICINE | Facility: CLINIC | Age: 28
End: 2023-07-05
Payer: MEDICAID

## 2023-07-05 VITALS
HEIGHT: 66 IN | WEIGHT: 174.38 LBS | SYSTOLIC BLOOD PRESSURE: 111 MMHG | HEART RATE: 105 BPM | DIASTOLIC BLOOD PRESSURE: 75 MMHG | TEMPERATURE: 97.7 F | BODY MASS INDEX: 28.03 KG/M2 | OXYGEN SATURATION: 97 %

## 2023-07-05 DIAGNOSIS — Z13.220 ENCOUNTER FOR SCREENING FOR LIPOID DISORDERS: ICD-10-CM

## 2023-07-05 DIAGNOSIS — Z00.00 ENCOUNTER FOR GENERAL ADULT MEDICAL EXAMINATION W/OUT ABNORMAL FINDINGS: ICD-10-CM

## 2023-07-05 DIAGNOSIS — Z11.3 ENCOUNTER FOR SCREENING FOR INFECTIONS WITH A PREDOMINANTLY SEXUAL MODE OF TRANSMISSION: ICD-10-CM

## 2023-07-05 DIAGNOSIS — R51.9 HEADACHE, UNSPECIFIED: ICD-10-CM

## 2023-07-05 DIAGNOSIS — Z13.1 ENCOUNTER FOR SCREENING FOR DIABETES MELLITUS: ICD-10-CM

## 2023-07-05 PROCEDURE — 99212 OFFICE O/P EST SF 10 MIN: CPT | Mod: 25

## 2023-07-05 PROCEDURE — 99395 PREV VISIT EST AGE 18-39: CPT | Mod: 25

## 2023-07-05 PROCEDURE — 99202 OFFICE O/P NEW SF 15 MIN: CPT | Mod: 25

## 2023-07-05 PROCEDURE — 36415 COLL VENOUS BLD VENIPUNCTURE: CPT

## 2023-07-05 PROCEDURE — 99385 PREV VISIT NEW AGE 18-39: CPT | Mod: 25

## 2023-07-05 NOTE — HISTORY OF PRESENT ILLNESS
[FreeTextEntry1] : 27-year-old female with history of negative cardiology and neurology work-up for LOC episode 3 years ago (without recurrence), otherwise without any significant medical history and no current medications, now presents to establish medical care and for initial examination [de-identified] : Complains of frequent headaches over the past few months, nonprogressive but not improving.  Headaches largely located at vertex and occipital areas.  No preceding head trauma.  Non-positional.  She denies aura or associated neurologic symptoms including visual changes, cognitive changes, paresthesias, localized weakness, etc.  Also denies nausea/vomiting.  Confirms sleep deprivation due to work and child.  States headache severity is mild to moderate, rarely severe and does not interfere with normal activities.

## 2023-07-05 NOTE — ASSESSMENT
[FreeTextEntry1] : Health Maintenance\par Her BMI indicates overweight status and at least a 20 pound weight loss is recommended.  Lifestyle management to achieve this was reviewed.\par Daily aerobic exercises strongly recommended.\par No STD risk or substance abuse per patient report.\par Occasional gender specific self-examination is suggested.\par Yearly GYN follow-up/Pap smear strongly recommended.\par No depression. Competent with ADLs.\par Colonoscopy is not due this year.\par Completed COVID-vaccine series but declines all boosters.\par Yearly flu vaccine is recommended.\par \par Vertex headache\par Discussed likely differential diagnosis including sleep deprivation, dehydration, allergic sinusitis, and generalized stress.  Migraine headache as well as reversible cerebral vasoconstriction syndrome both seem very unlikely based on localization and characteristics.\par Treatment recommendations reviewed including #1) increased nightly sleep duration, #2) hydration, #3) OTC antihistamines for possible allergies, and #4) stress management (including mindfulness/meditation exercises).\par Would also treat on an [id number]-[id number] basis with OTC NSAIDs and/or Tylenol.\par Referral and contact information provided for consultation with neurologist for further discussion, especially if symptoms persist despite the above management.\par \par

## 2023-07-06 LAB
ALBUMIN SERPL ELPH-MCNC: 4.5 G/DL
ALP BLD-CCNC: 89 U/L
ALT SERPL-CCNC: 11 U/L
ANION GAP SERPL CALC-SCNC: 11 MMOL/L
AST SERPL-CCNC: 15 U/L
BILIRUB SERPL-MCNC: 0.3 MG/DL
BUN SERPL-MCNC: 13 MG/DL
CALCIUM SERPL-MCNC: 9.7 MG/DL
CHLORIDE SERPL-SCNC: 106 MMOL/L
CHOLEST SERPL-MCNC: 157 MG/DL
CO2 SERPL-SCNC: 24 MMOL/L
CREAT SERPL-MCNC: 0.88 MG/DL
EGFR: 93 ML/MIN/1.73M2
ESTIMATED AVERAGE GLUCOSE: 103 MG/DL
GLUCOSE SERPL-MCNC: 81 MG/DL
HBA1C MFR BLD HPLC: 5.2 %
HDLC SERPL-MCNC: 46 MG/DL
LDLC SERPL CALC-MCNC: 83 MG/DL
NONHDLC SERPL-MCNC: 112 MG/DL
POTASSIUM SERPL-SCNC: 4.4 MMOL/L
PROT SERPL-MCNC: 7 G/DL
SODIUM SERPL-SCNC: 141 MMOL/L
TRIGL SERPL-MCNC: 143 MG/DL
TSH SERPL-ACNC: 1.02 UIU/ML

## 2023-07-19 ENCOUNTER — APPOINTMENT (OUTPATIENT)
Dept: INTERNAL MEDICINE | Facility: CLINIC | Age: 28
End: 2023-07-19

## 2023-09-29 NOTE — ED ADULT NURSE NOTE - NS ED NURSE RECORD ANOTHER VITAL SIGN
Physical Therapy Note:    Attempted to see patient this AM for physical therapy evaluation session. Patient has not received an offloading shoe. Will hold evaluation until then per request of vascular surgery. Will follow and re-attempt as schedule permits/patient available.  Thank you,    Janes Cardoso PT     Rehab Caseload Tracker Yes

## 2024-09-23 ENCOUNTER — EMERGENCY (EMERGENCY)
Facility: HOSPITAL | Age: 29
LOS: 1 days | Discharge: HOME | End: 2024-09-23
Admitting: EMERGENCY MEDICINE
Payer: MEDICAID

## 2024-09-23 VITALS
WEIGHT: 171.96 LBS | HEART RATE: 77 BPM | HEIGHT: 66 IN | OXYGEN SATURATION: 99 % | SYSTOLIC BLOOD PRESSURE: 106 MMHG | DIASTOLIC BLOOD PRESSURE: 69 MMHG | RESPIRATION RATE: 18 BRPM | TEMPERATURE: 98 F

## 2024-09-23 VITALS
OXYGEN SATURATION: 98 % | RESPIRATION RATE: 17 BRPM | HEART RATE: 82 BPM | TEMPERATURE: 98 F | SYSTOLIC BLOOD PRESSURE: 99 MMHG | DIASTOLIC BLOOD PRESSURE: 61 MMHG

## 2024-09-23 LAB
APPEARANCE UR: CLEAR — SIGNIFICANT CHANGE UP
BACTERIA # UR AUTO: NEGATIVE /HPF — SIGNIFICANT CHANGE UP
BILIRUB UR-MCNC: NEGATIVE — SIGNIFICANT CHANGE UP
COD CRY URNS QL: SIGNIFICANT CHANGE UP
COLOR SPEC: YELLOW — SIGNIFICANT CHANGE UP
DIFF PNL FLD: ABNORMAL
EPI CELLS # UR: 1 — SIGNIFICANT CHANGE UP
GLUCOSE UR QL: NEGATIVE MG/DL — SIGNIFICANT CHANGE UP
GRAN CASTS # UR COMP ASSIST: SIGNIFICANT CHANGE UP
HCG UR QL: NEGATIVE — SIGNIFICANT CHANGE UP
HYALINE CASTS # UR AUTO: SIGNIFICANT CHANGE UP
KETONES UR-MCNC: NEGATIVE MG/DL — SIGNIFICANT CHANGE UP
LEUKOCYTE ESTERASE UR-ACNC: NEGATIVE — SIGNIFICANT CHANGE UP
NITRITE UR-MCNC: NEGATIVE — SIGNIFICANT CHANGE UP
PH UR: 7 — SIGNIFICANT CHANGE UP (ref 5–8)
PROT UR-MCNC: NEGATIVE MG/DL — SIGNIFICANT CHANGE UP
RBC CASTS # UR COMP ASSIST: 3 /HPF — SIGNIFICANT CHANGE UP (ref 0–4)
SP GR SPEC: 1.01 — SIGNIFICANT CHANGE UP (ref 1–1.03)
TRI-PHOS CRY UR QL COMP ASSIST: SIGNIFICANT CHANGE UP
URATE CRY FLD QL MICRO: SIGNIFICANT CHANGE UP
UROBILINOGEN FLD QL: 0.2 MG/DL — SIGNIFICANT CHANGE UP (ref 0.2–1)
WBC UR QL: 1 /HPF — SIGNIFICANT CHANGE UP (ref 0–5)

## 2024-09-23 PROCEDURE — 99284 EMERGENCY DEPT VISIT MOD MDM: CPT

## 2024-09-23 PROCEDURE — 70450 CT HEAD/BRAIN W/O DYE: CPT | Mod: 26,MC

## 2024-09-23 RX ORDER — KETOROLAC TROMETHAMINE 30 MG/ML
30 INJECTION, SOLUTION INTRAMUSCULAR ONCE
Refills: 0 | Status: DISCONTINUED | OUTPATIENT
Start: 2024-09-23 | End: 2024-09-23

## 2024-09-23 RX ORDER — SODIUM CHLORIDE 9 MG/ML
1000 INJECTION INTRAMUSCULAR; INTRAVENOUS; SUBCUTANEOUS ONCE
Refills: 0 | Status: COMPLETED | OUTPATIENT
Start: 2024-09-23 | End: 2024-09-23

## 2024-09-23 RX ORDER — DIPHENHYDRAMINE HCL 50 MG
25 CAPSULE ORAL ONCE
Refills: 0 | Status: COMPLETED | OUTPATIENT
Start: 2024-09-23 | End: 2024-09-23

## 2024-09-23 RX ORDER — METOCLOPRAMIDE HCL 5 MG
10 TABLET ORAL ONCE
Refills: 0 | Status: COMPLETED | OUTPATIENT
Start: 2024-09-23 | End: 2024-09-23

## 2024-09-23 RX ADMIN — KETOROLAC TROMETHAMINE 30 MILLIGRAM(S): 30 INJECTION, SOLUTION INTRAMUSCULAR at 14:37

## 2024-09-23 RX ADMIN — Medication 25 MILLIGRAM(S): at 14:38

## 2024-09-23 RX ADMIN — SODIUM CHLORIDE 1000 MILLILITER(S): 9 INJECTION INTRAMUSCULAR; INTRAVENOUS; SUBCUTANEOUS at 14:37

## 2024-09-23 RX ADMIN — Medication 10 MILLIGRAM(S): at 14:38

## 2024-09-23 NOTE — ED PROVIDER NOTE - OBJECTIVE STATEMENT
29-year-old female, no medical history, presents this department for headache.  Patient states that over the past week it has getting worse gradually.  However over the last hour and a half she has felt it increasing in pain.  Has not really medicated for symptomatic relief.  Denies any history of headaches, or migraines. Denies sudden onset, severe pain.  Denies head trauma, LOC, memory loss, vomiting. Denies fevers, neck pain/stiffness, photophobia, recent URI-like symptoms. Denies visual disturbances, vision loss, blurry vision. Denies jaw claudications, temporal headache.  Denies past strokes.

## 2024-09-23 NOTE — ED PROVIDER NOTE - CLINICAL SUMMARY MEDICAL DECISION MAKING FREE TEXT BOX
42 yo F presents to this ED for a headache  Denies sudden onset, blood thinner use, not severe in nature, no head trauma noted - subarachnoid hemorrhage unlikely  Pt does not meet [place] CT rules   [facility] C-spine rules note met - no C-spine imaging necessary.  Denies jaw claudication, no tenderness over temporal artery, no fever present, no visual loss present - giant cell arteritis unlikely  Denies neck stiffness, no nuchal rigidity, photophobia seen on physical exam - meningitis unlikely.  Pt states that this is a different HA  - CT Head ordered  Pain control ordered for symptomatic relief

## 2024-09-23 NOTE — ED PROVIDER NOTE - NSFOLLOWUPINSTRUCTIONS_ED_ALL_ED_FT
Overview  Headaches have many possible causes. Most headaches aren't a sign of a more serious problem, and they will get better on their own. Home treatment may help you feel better faster.    The doctor has checked you carefully, but problems can develop later. If you notice any problems or new symptoms, get medical treatment right away.    Follow-up care is a key part of your treatment and safety. Be sure to make and go to all appointments, and call your doctor or nurse advice line ([phone] in most provinces and territories) if you are having problems. It's also a good idea to know your test results and keep a list of the medicines you take.    How can you care for yourself at home?  Rest in a quiet, dark room until your headache is gone. Close your eyes and try to relax or go to sleep. Don't watch TV or read.  Put a cold, moist cloth or cold pack on the painful area for 10 to 20 minutes at a time. Put a thin cloth between the cold pack and your skin.  Use a warm, moist towel or a heating pad set on low to relax tight shoulder and neck muscles.  Have someone gently massage your neck and shoulders.  Take pain medicines exactly as directed.  If the doctor gave you a prescription medicine for pain, take it as prescribed.  If you are not taking a prescription pain medicine, ask your doctor if you can take an over-the-counter medicine.  Do not ignore new symptoms that occur with a headache, such as a fever, weakness or numbness, vision changes, or confusion. These may be signs of a more serious problem.  To prevent headaches  Keep a headache diary so you can figure out what triggers your headaches. Avoiding triggers may help you prevent headaches. Record when each headache began, how long it lasted, and what the pain was like (throbbing, aching, stabbing, or dull). Write down any other symptoms you had with the headache, such as nausea, flashing lights or dark spots, or sensitivity to bright light or loud noise. Note if the headache occurred near your period. List anything that might have triggered the headache, such as certain foods (chocolate, cheese, wine) or odours, smoke, bright light, stress, or lack of sleep.  Find healthy ways to deal with stress. Headaches are most common during or right after stressful times. Take time to relax before and after you do something that has caused a headache in the past.  Try to keep your muscles relaxed by keeping good posture. Check your jaw, face, neck, and shoulder muscles for tension, and try relaxing them. When sitting at a desk, change positions often, and stretch for 30 seconds each hour.  Get plenty of sleep and exercise.  Eat regularly. Long periods without food can trigger a headache.  Limit caffeine by not drinking too much coffee, tea, or soda. But don't quit caffeine suddenly, because that can also give you headaches.  Reduce eye strain from computers by blinking frequently and looking away from the computer screen every so often. Make sure you have proper eyewear and that your monitor is set up properly, about an arm's length away.  When should you call for help?  	  Call 911 anytime you think you may need emergency care. For example, call if:    You have signs of a stroke. These may include:  Sudden numbness, paralysis, or weakness in your face, arm, or leg, especially on only one side of your body.  Sudden vision changes.  Sudden trouble speaking.  Sudden confusion or trouble understanding simple statements.  Sudden problems with walking or balance.  A sudden, severe headache that is different from past headaches.  Call your doctor or nurse advice line now or seek immediate medical care if:    You have a fever and a stiff neck.  You have new nausea and vomiting, or you cannot keep down food or fluids.  Your headache gets much worse.  Watch closely for changes in your health, and be sure to contact your doctor or nurse advice line if:    Your headaches get worse, happen more often, or change in some way.  You have new symptoms.  Your life is disrupted by your headaches. For example, you often miss work, school, or other activities.  You do not get better as expected.

## 2024-09-23 NOTE — ED ADULT NURSE REASSESSMENT NOTE - NS ED NURSE REASSESS COMMENT FT1
Pt in bed, Pt AOX4, speaking in full clear sentences, breathing equal and unlabored  Pt reports feeling much better, headache gone  Pt in nad, no current complaints  Plan of care ongoing

## 2024-09-23 NOTE — ED ADULT NURSE NOTE - OBJECTIVE STATEMENT
Pt presents to ed ambulatory, Pt AOX4, speaking in full clear sentences, breathing equal and unlabored  Pt reporting that around 1.5 hours PTA, pt was at work and developed blurry vision in left eye and 7/10 hammering headache on left side  Pt denies personal hx but reports FMH DM and heart disease  NIHSS negative for any findings, pt denying any current dizziness, blurry vision  PERRLA  Pt with no other complaints  Plan of care ongoing

## 2024-09-23 NOTE — ED PROVIDER NOTE - PATIENT PORTAL LINK FT
You can access the FollowMyHealth Patient Portal offered by [facility] by registering at the following website: [email or web address]. By joining [employer]’s FollowMyHealth portal, you will also be able to view your health information using other applications (apps) compatible with our system.

## 2024-09-23 NOTE — ED ADULT NURSE NOTE - IS THE PATIENT ABLE TO BE SCREENED?
Spoke with patient via phone.   Last INR 3/17 was 2.1.  Dose maintained per protocol.   Today's INR is 2.4 and is within goal range.    Current warfarin dosing verified with patient. Patient was informed that their INR result is within therapeutic range and instructed to maintain current dose per protocol. Discussed dose and return date for next INR.    Dr. óscar VARGAS is in the office today supervising the treatment.         Patient was instructed to contact the clinic with any unusual bleeding or bruising, any changes in medications, diet, health status, lifestyle, or any other changes, questions or concerns. Patient verbalized understanding of all discussed.      Yes

## 2024-09-23 NOTE — ED ADULT NURSE REASSESSMENT NOTE - NS ED NURSE REASSESS COMMENT FT1
22glac initiated as ordered  Fluids initiated as ordered and all medications administered as ordered. All pertinent patient education given and patient gave verbal confirmation they understand the reason and purpose for medication/fluid administration. Plan of care ongoing

## 2024-09-23 NOTE — ED PROVIDER NOTE - PROGRESS NOTE DETAILS
Patient sitting comfortably on, no acute distress.  States that she is completely asymptomatic  CT is unremarkable  Patient stable for discharge  Will results with patient.  Patient understands and agrees with plan.  Agree 12 with primary care doctor in 2 to 3 days

## 2024-09-23 NOTE — ED ADULT TRIAGE NOTE - CHIEF COMPLAINT QUOTE
Pt walk in c/o gradual onset headache with associated nausea, Endorses feeling generally weak. Denies dizziness, dysarthria. Denies vision changes at present. Denies fever, cough, congestion.

## 2024-09-27 DIAGNOSIS — R51.9 HEADACHE, UNSPECIFIED: ICD-10-CM

## 2024-09-27 DIAGNOSIS — G44.89 OTHER HEADACHE SYNDROME: ICD-10-CM

## 2024-11-15 ENCOUNTER — EMERGENCY (EMERGENCY)
Facility: HOSPITAL | Age: 29
LOS: 1 days | Discharge: HOME | End: 2024-11-15
Admitting: EMERGENCY MEDICINE
Payer: MEDICAID

## 2024-11-15 VITALS
SYSTOLIC BLOOD PRESSURE: 113 MMHG | RESPIRATION RATE: 16 BRPM | WEIGHT: 175.93 LBS | HEIGHT: 66 IN | OXYGEN SATURATION: 98 % | HEART RATE: 90 BPM | DIASTOLIC BLOOD PRESSURE: 75 MMHG | TEMPERATURE: 98 F

## 2024-11-15 LAB
ALBUMIN SERPL ELPH-MCNC: 3.6 G/DL — SIGNIFICANT CHANGE UP (ref 3.4–5)
ALP SERPL-CCNC: 70 U/L — SIGNIFICANT CHANGE UP (ref 40–120)
ALT FLD-CCNC: 16 U/L — SIGNIFICANT CHANGE UP (ref 12–42)
ANION GAP SERPL CALC-SCNC: 4 MMOL/L — LOW (ref 9–16)
APPEARANCE UR: ABNORMAL
AST SERPL-CCNC: 20 U/L — SIGNIFICANT CHANGE UP (ref 15–37)
BASOPHILS # BLD AUTO: 0.05 K/UL — SIGNIFICANT CHANGE UP (ref 0–0.2)
BASOPHILS NFR BLD AUTO: 0.4 % — SIGNIFICANT CHANGE UP (ref 0–2)
BILIRUB DIRECT SERPL-MCNC: 0.1 MG/DL — SIGNIFICANT CHANGE UP (ref 0–0.3)
BILIRUB INDIRECT FLD-MCNC: 0.1 MG/DL — LOW (ref 0.2–1)
BILIRUB SERPL-MCNC: 0.2 MG/DL — SIGNIFICANT CHANGE UP (ref 0.2–1.2)
BILIRUB UR-MCNC: NEGATIVE — SIGNIFICANT CHANGE UP
BUN SERPL-MCNC: 13 MG/DL — SIGNIFICANT CHANGE UP (ref 7–23)
CALCIUM SERPL-MCNC: 8.6 MG/DL — SIGNIFICANT CHANGE UP (ref 8.5–10.5)
CHLORIDE SERPL-SCNC: 107 MMOL/L — SIGNIFICANT CHANGE UP (ref 96–108)
CO2 SERPL-SCNC: 26 MMOL/L — SIGNIFICANT CHANGE UP (ref 22–31)
COLOR SPEC: YELLOW — SIGNIFICANT CHANGE UP
CREAT SERPL-MCNC: 0.83 MG/DL — SIGNIFICANT CHANGE UP (ref 0.5–1.3)
DIFF PNL FLD: NEGATIVE — SIGNIFICANT CHANGE UP
EGFR: 99 ML/MIN/1.73M2 — SIGNIFICANT CHANGE UP
EOSINOPHIL # BLD AUTO: 0.1 K/UL — SIGNIFICANT CHANGE UP (ref 0–0.5)
EOSINOPHIL NFR BLD AUTO: 0.8 % — SIGNIFICANT CHANGE UP (ref 0–6)
GLUCOSE SERPL-MCNC: 105 MG/DL — HIGH (ref 70–99)
GLUCOSE UR QL: NEGATIVE MG/DL — SIGNIFICANT CHANGE UP
HCG SERPL-ACNC: 1 MIU/ML — SIGNIFICANT CHANGE UP
HCT VFR BLD CALC: 35.5 % — SIGNIFICANT CHANGE UP (ref 34.5–45)
HGB BLD-MCNC: 11.8 G/DL — SIGNIFICANT CHANGE UP (ref 11.5–15.5)
IMM GRANULOCYTES NFR BLD AUTO: 0.5 % — SIGNIFICANT CHANGE UP (ref 0–0.9)
KETONES UR-MCNC: NEGATIVE MG/DL — SIGNIFICANT CHANGE UP
LEUKOCYTE ESTERASE UR-ACNC: NEGATIVE — SIGNIFICANT CHANGE UP
LIDOCAIN IGE QN: 29 U/L — SIGNIFICANT CHANGE UP (ref 16–77)
LYMPHOCYTES # BLD AUTO: 1.61 K/UL — SIGNIFICANT CHANGE UP (ref 1–3.3)
LYMPHOCYTES # BLD AUTO: 12.9 % — LOW (ref 13–44)
MCHC RBC-ENTMCNC: 29.5 PG — SIGNIFICANT CHANGE UP (ref 27–34)
MCHC RBC-ENTMCNC: 33.2 G/DL — SIGNIFICANT CHANGE UP (ref 32–36)
MCV RBC AUTO: 88.8 FL — SIGNIFICANT CHANGE UP (ref 80–100)
MONOCYTES # BLD AUTO: 1.02 K/UL — HIGH (ref 0–0.9)
MONOCYTES NFR BLD AUTO: 8.2 % — SIGNIFICANT CHANGE UP (ref 2–14)
NEUTROPHILS # BLD AUTO: 9.61 K/UL — HIGH (ref 1.8–7.4)
NEUTROPHILS NFR BLD AUTO: 77.2 % — HIGH (ref 43–77)
NITRITE UR-MCNC: NEGATIVE — SIGNIFICANT CHANGE UP
NRBC # BLD: 0 /100 WBCS — SIGNIFICANT CHANGE UP (ref 0–0)
PH UR: 8 — SIGNIFICANT CHANGE UP (ref 5–8)
PLATELET # BLD AUTO: 283 K/UL — SIGNIFICANT CHANGE UP (ref 150–400)
POTASSIUM SERPL-MCNC: 3.8 MMOL/L — SIGNIFICANT CHANGE UP (ref 3.5–5.3)
POTASSIUM SERPL-SCNC: 3.8 MMOL/L — SIGNIFICANT CHANGE UP (ref 3.5–5.3)
PROT SERPL-MCNC: 6.9 G/DL — SIGNIFICANT CHANGE UP (ref 6.4–8.2)
PROT UR-MCNC: ABNORMAL MG/DL
RBC # BLD: 4 M/UL — SIGNIFICANT CHANGE UP (ref 3.8–5.2)
RBC # FLD: 12.3 % — SIGNIFICANT CHANGE UP (ref 10.3–14.5)
SODIUM SERPL-SCNC: 137 MMOL/L — SIGNIFICANT CHANGE UP (ref 132–145)
SP GR SPEC: 1.02 — SIGNIFICANT CHANGE UP (ref 1–1.03)
UROBILINOGEN FLD QL: 1 MG/DL — SIGNIFICANT CHANGE UP (ref 0.2–1)
WBC # BLD: 12.45 K/UL — HIGH (ref 3.8–10.5)
WBC # FLD AUTO: 12.45 K/UL — HIGH (ref 3.8–10.5)

## 2024-11-15 PROCEDURE — 99285 EMERGENCY DEPT VISIT HI MDM: CPT

## 2024-11-15 PROCEDURE — 74177 CT ABD & PELVIS W/CONTRAST: CPT | Mod: 26,MC

## 2024-11-15 RX ORDER — MORPHINE SULFATE 30 MG/1
6 TABLET, EXTENDED RELEASE ORAL ONCE
Refills: 0 | Status: DISCONTINUED | OUTPATIENT
Start: 2024-11-15 | End: 2024-11-15

## 2024-11-15 NOTE — ED PROVIDER NOTE - PROGRESS NOTE DETAILS
pt reports that IUD was placed 2 yr ago, she attempted removal at her GYN office but was unsuccessful 2 wk ago due to difficulty removing IUD Abiel Johnson  recommends outpatient follow up since pain is controlled in the ed and pt has no fevers and no associated abscess or phlegmon

## 2024-11-15 NOTE — ED PROVIDER NOTE - PATIENT PORTAL LINK FT
You can access the FollowMyHealth Patient Portal offered by [facility] by registering at the following website: [email or web address]. By joining [facility]’s FollowMyHealth portal, you will also be able to view your health information using other applications (apps) compatible with our system.

## 2024-11-15 NOTE — ED PROVIDER NOTE - PHYSICAL EXAMINATION
Physical Exam    Vital Signs: I have reviewed the initial vital signs.  Constitutional: well-appearing, appears stated age  Eyes: PERRLA, EOM intact, RAPD absent, and symmetrical lids.  ENT: Neck supple with no adenopathy, moist MM.  Cardiovascular: regular rate, regular rhythm, well-perfused extremities  Respiratory: unlabored respiratory effort, clear to auscultation bilaterally  Gastrointestinal: soft, +RLQ ttp, no guarding, no rebound, no cvat b/l  Musculoskeletal: supple neck, no lower extremity edema  Integumentary: warm, dry, no rash  Neurologic: awake, alert, extremities’ motor and sensory functions grossly intact  Psychiatric: A&Ox3, appropriate mood, appropriate affect

## 2024-11-15 NOTE — ED PROVIDER NOTE - CARE PROVIDER_API CALL
Abiel Cameron  Obstetrics and Gynecology  [address] [address]  Phone: [phone]  Fax: [phone]  Follow Up Time:

## 2024-11-15 NOTE — ED PROVIDER NOTE - CLINICAL SUMMARY MEDICAL DECISION MAKING FREE TEXT BOX
28 yo f here with worsening RLQ abd pain aching mod in severity non radiating with no nausea or vomiting; she had pain that developed over the course of the last 2 d increasing in severity over the last 2 d. No flank pain, fevers, chills, n/v. She reports that RLQ abd pain is radiating to the back now 8/10. No ams or focal deficits. pt denies vag bleeding or discharge, no dysuria, no urgency, no frequency. On exam pt has RLQ ttp with no guarding or rebound, no cvat b/l, she has no  symptoms, will r/o urethritis, renal stone, appendicitis plan: cbc, cmp, lipase, ctap, hcg, ua

## 2024-11-15 NOTE — ED PROVIDER NOTE - NSPTACCESSSVCSAPPTDETAILS_ED_ALL_ED_FT
Abiel Albert OB/GYN follow up for IUD removal (IUD perforated uterine wall with no signs of phlegmon or infection).

## 2024-11-15 NOTE — ED ADULT NURSE NOTE - OBJECTIVE STATEMENT
30 y/o F here with lower abdominal pain and lower back pain since this morning. Denies any nausea, vomiting or diarrhea. Denies any pain upon urination. Pt reports pain worse with movement rated 7/10. LMP 2 weeks ago.

## 2024-11-15 NOTE — ED PROVIDER NOTE - NSFOLLOWUPINSTRUCTIONS_ED_ALL_ED_FT
IF YOU HAVE WORSENING PAIN, FEVERS, OR NAUSEA VOMITING WITH INABILITY TO EAT AND DRINK RETURN TO THE ER RIGHT AWAY.    please follow up with physician provided

## 2024-11-15 NOTE — ED ADULT TRIAGE NOTE - CHIEF COMPLAINT QUOTE
Pt with complaint of lower abdominal pain and lower back pain since this morning. Denies any nausea, vomiting or diarrhea. Denies any pain upon urination. Pt reports pain worse with movement rated 7/10. LMP 2 weeks ago.

## 2024-11-15 NOTE — ED ADULT NURSE NOTE - NS ED NURSE IV DC DT
[FreeTextEntry1] : Moderate COPD\par Moderate ENZO - doesn't want Rx at this time\par GERD with aspiration - quiescent \par Dyspnea on exertion - mild \par Obesity 
16-Nov-2024 03:05

## 2024-11-15 NOTE — ED PROVIDER NOTE - OBJECTIVE STATEMENT
28 yo f here with worsening RLQ abd pain aching mod in severity non radiating with no nausea or vomiting; she had pain that developed over the course of the last 2 d increasing in severity over the last 2 d. No flank pain, fevers, chills, n/v. She reports that RLQ abd pain is radiating to the back now 8/10. No ams or focal deficits. pt denies vag bleeding or discharge, no dysuria, no urgency, no frequency.    I have reviewed available current nursing and previous documentation of past medical, surgical, family, and/or social history.

## 2024-11-16 VITALS
SYSTOLIC BLOOD PRESSURE: 106 MMHG | HEART RATE: 79 BPM | RESPIRATION RATE: 16 BRPM | OXYGEN SATURATION: 98 % | TEMPERATURE: 99 F | DIASTOLIC BLOOD PRESSURE: 66 MMHG

## 2024-11-16 PROBLEM — Z78.9 OTHER SPECIFIED HEALTH STATUS: Chronic | Status: ACTIVE | Noted: 2024-09-23

## 2024-11-16 RX ORDER — IBUPROFEN 200 MG
1 TABLET ORAL
Qty: 56 | Refills: 0
Start: 2024-11-16 | End: 2024-11-29

## 2024-11-16 RX ORDER — KETOROLAC TROMETHAMINE 30 MG/ML
15 INJECTION INTRAMUSCULAR; INTRAVENOUS ONCE
Refills: 0 | Status: DISCONTINUED | OUTPATIENT
Start: 2024-11-16 | End: 2024-11-16

## 2024-11-16 RX ADMIN — KETOROLAC TROMETHAMINE 15 MILLIGRAM(S): 30 INJECTION INTRAMUSCULAR; INTRAVENOUS at 03:02

## 2024-11-16 RX ADMIN — MORPHINE SULFATE 6 MILLIGRAM(S): 30 TABLET, EXTENDED RELEASE ORAL at 00:31

## 2024-11-19 DIAGNOSIS — R10.31 RIGHT LOWER QUADRANT PAIN: ICD-10-CM

## 2024-11-19 DIAGNOSIS — T83.9XXA UNSPECIFIED COMPLICATION OF GENITOURINARY PROSTHETIC DEVICE, IMPLANT AND GRAFT, INITIAL ENCOUNTER: ICD-10-CM

## 2024-12-17 ENCOUNTER — TRANSCRIPTION ENCOUNTER (OUTPATIENT)
Age: 29
End: 2024-12-17

## 2025-04-06 ENCOUNTER — EMERGENCY (EMERGENCY)
Facility: HOSPITAL | Age: 30
LOS: 1 days | End: 2025-04-06
Attending: EMERGENCY MEDICINE | Admitting: EMERGENCY MEDICINE
Payer: SELF-PAY

## 2025-04-06 VITALS
SYSTOLIC BLOOD PRESSURE: 105 MMHG | WEIGHT: 173.06 LBS | RESPIRATION RATE: 18 BRPM | TEMPERATURE: 98 F | DIASTOLIC BLOOD PRESSURE: 71 MMHG | HEART RATE: 81 BPM

## 2025-04-06 VITALS
TEMPERATURE: 98 F | RESPIRATION RATE: 16 BRPM | HEART RATE: 82 BPM | DIASTOLIC BLOOD PRESSURE: 68 MMHG | OXYGEN SATURATION: 98 % | SYSTOLIC BLOOD PRESSURE: 101 MMHG

## 2025-04-06 LAB
FLUAV AG NPH QL: SIGNIFICANT CHANGE UP
FLUBV AG NPH QL: SIGNIFICANT CHANGE UP
RSV RNA NPH QL NAA+NON-PROBE: SIGNIFICANT CHANGE UP
SARS-COV-2 RNA SPEC QL NAA+PROBE: SIGNIFICANT CHANGE UP
SOURCE RESPIRATORY: SIGNIFICANT CHANGE UP

## 2025-04-06 PROCEDURE — 99053 MED SERV 10PM-8AM 24 HR FAC: CPT

## 2025-04-06 PROCEDURE — 99284 EMERGENCY DEPT VISIT MOD MDM: CPT

## 2025-04-06 RX ORDER — DIPHENHYDRAMINE HCL 12.5MG/5ML
25 ELIXIR ORAL ONCE
Refills: 0 | Status: COMPLETED | OUTPATIENT
Start: 2025-04-06 | End: 2025-04-06

## 2025-04-06 RX ORDER — METHYLPREDNISOLONE ACETATE 80 MG/ML
125 INJECTION, SUSPENSION INTRA-ARTICULAR; INTRALESIONAL; INTRAMUSCULAR; SOFT TISSUE ONCE
Refills: 0 | Status: COMPLETED | OUTPATIENT
Start: 2025-04-06 | End: 2025-04-06

## 2025-04-06 RX ORDER — METHYLPREDNISOLONE ACETATE 80 MG/ML
1 INJECTION, SUSPENSION INTRA-ARTICULAR; INTRALESIONAL; INTRAMUSCULAR; SOFT TISSUE
Qty: 9 | Refills: 0
Start: 2025-04-06 | End: 2025-04-08

## 2025-04-06 RX ORDER — DIPHENHYDRAMINE HCL 12.5MG/5ML
2 ELIXIR ORAL
Qty: 6 | Refills: 0
Start: 2025-04-06 | End: 2025-04-08

## 2025-04-06 RX ADMIN — METHYLPREDNISOLONE ACETATE 125 MILLIGRAM(S): 80 INJECTION, SUSPENSION INTRA-ARTICULAR; INTRALESIONAL; INTRAMUSCULAR; SOFT TISSUE at 02:49

## 2025-04-06 RX ADMIN — Medication 20 MILLIGRAM(S): at 02:49

## 2025-04-06 RX ADMIN — Medication 25 MILLIGRAM(S): at 02:49

## 2025-04-06 RX ADMIN — Medication 1000 MILLILITER(S): at 03:04

## 2025-04-06 NOTE — ED PROVIDER NOTE - NSFOLLOWUPINSTRUCTIONS_ED_ALL_ED_FT
Take Pepcid and Solu-medrol as prescribed.     Please read all handouts provided to you from the emergency department. Seek immediate medical attention for any new/worsening signs or symptoms.  Take any prescribed medications as directed. Please follow up with  your primary physician in the next 3-5 days.    To access your record on the patient portal [email or web address], please visit:  [email or web address]  If you are having difficulties setting this up, call [phone] and someone can assist you over the phone.    Return to the ER for worsening or persistent symptoms, and/or ANY NEW OR CONCERNING SYMPTOMS, including fever, chest pain, shortness of breath, dizziness, nausea, vomiting, diarrhea, unable to walk, weakness, numbness/tingling.       Allergies    WHAT YOU NEED TO KNOW:    What are allergies? Allergies are an immune system reaction to a substance called an allergen. Your immune system sees the allergen as harmful and attacks it.    What causes allergies? You may have allergies at certain times of the year or all year. The following are common allergies:    Seasonal airborne allergies happen during certain times of the year. This is also called hay fever. Tree, weed, or grass pollen are examples of allergens that you breathe in.    Environmental airborne allergy triggers you may breathe in year-round include dust, mold, and pet hair.    Contact allergies include latex, found in items such as condoms and medical gloves. Latex allergies can be very serious.    Insect sting allergies may be caused by bees, hornets, fire ants, or other insects that sting or bite you. Insect allergies can be very serious.    Food allergies commonly include shellfish, wheat, and eggs. Some foods must be eaten to produce an allergic reaction. Other foods can trigger a reaction if they touch your skin or are breathed in.  What increases my risk for allergies? Allergic reactions can happen at any time, even if you have not had allergies before. You may develop an allergy after you have been exposed to an allergen more than once. Allergies are most common in children and elderly people, but anyone can have an allergic reaction. Your risk is also increased if you have a family history of allergies or a medical condition such as asthma.    What are the signs and symptoms of allergies?    Mild symptoms include sneezing and a runny, itchy, or stuffy nose. You may also have swollen, watery, or itchy eyes, or skin itching. You may have swelling or pain where an insect bit or stung you.    Anaphylaxis symptoms include trouble breathing or swallowing, a rash or hives, or severe swelling. You may also have a cough, wheezing, or feel lightheaded or dizzy. Anaphylaxis is a sudden, life-threatening reaction that needs immediate treatment.  How are allergies diagnosed? Your healthcare provider will ask about your signs and symptoms. The provider will ask which allergens you have been exposed to and if you have ever had other allergic reactions. The provider may look in your nose, ears, or throat. You may need additional testing if you developed anaphylaxis after you were exposed to a trigger and then exercised. This is called exercise-induced anaphylaxis. You may also need the following tests:    Blood tests are used to check for signs of a reaction to allergens.    Nasal tests are used to see how your nasal passages react to allergens. A sample of your nasal fluid may also be tested.    Skin tests can help your healthcare provider find what you are allergic to. A small amount of allergen will be placed on your arm or back. Your skin will then be pricked with a needle. Your provider will watch how your skin reacts to the allergen.  How are allergies treated?    Antihistamines help decrease itching, sneezing, and swelling. You may take them as a pill or use drops in your nose or eyes.    Decongestants help your nose feel less stuffy.    Steroids decrease swelling and redness.    Topical treatments help decrease itching or swelling. You also may be given nasal sprays or eyedrops.    Epinephrine is medicine used to treat severe allergic reactions such as anaphylaxis.    Desensitization gets your body used to allergens you cannot avoid. Your healthcare provider will give you a shot that contains a small amount of an allergen. Any allergic reaction you have will be treated. Your provider will give you more of the allergen a little at a time until your body gets used to it. Your reaction to the allergen may be less serious after this treatment. Your provider will tell you how long to get the shots.  What steps do I need to take for signs or symptoms of anaphylaxis?    Immediately give 1 shot of epinephrine only into the outer thigh muscle.    Leave the shot in place as directed. Your healthcare provider may recommend you leave it in place for up to 10 seconds before you remove it. This helps make sure all of the epinephrine is delivered.    Call 911 and go to the emergency department, even if the shot improved symptoms. Do not drive yourself. Bring the used epinephrine shot with you.  What safety precautions do I need to take if I am at risk for anaphylaxis?    Keep 2 shots of epinephrine with you at all times. You may need a second shot, because epinephrine only works for about 20 minutes and symptoms may return. Your healthcare provider can show you and family members how to give the shot. Check the expiration date every month and replace it before it expires.    Create an action plan. Your healthcare provider can help you create a written plan that explains the allergy and an emergency plan to treat a reaction. The plan explains when to give a second epinephrine shot if symptoms return or do not improve after the first. Give copies of the action plan and emergency instructions to family members and work staff. Show them how to give a shot of epinephrine.    Be careful when you exercise. If you have had exercise-induced anaphylaxis, do not exercise right after you eat. Stop exercising right away if you start to develop any signs or symptoms of anaphylaxis. You may first feel tired, warm, or have itchy skin. Hives, swelling, and severe breathing problems may develop if you continue to exercise.    Carry medical alert identification. Wear medical alert jewelry or carry a card that explains the allergy. Ask your healthcare provider where to get these items.  Medical Alert Jewelry      Inform all healthcare providers of the allergy. This includes dentists, nurses, doctors, and surgeons.  How can I manage allergies?    Use nasal rinses as directed. Rinse with a saline solution daily. This will help clear allergens out of your nose. Use distilled water if possible. You can also boil tap water and let it cool before you use it. Do not use tap water that has not been boiled.    Do not smoke. Allergy symptoms may decrease if you are not around smoke. Nicotine and other chemicals in cigarettes and cigars can cause lung damage. Ask your healthcare provider for information if you currently smoke and need help to quit. E-cigarettes or smokeless tobacco still contain nicotine. Talk to your healthcare provider before you use these products.  How can I prevent an allergic reaction?    Do not go outside when pollen counts are high if you have seasonal allergies. Your symptoms may be better if you go outside only in the morning or evening. Use your air conditioner, and change air filters often.    Avoid dust, fur, and mold. Dust and vacuum your home often. You may want to wear a mask when you vacuum. Keep pets in certain rooms, and bathe them often. Use a dehumidifier (machine that decreases moisture) to help prevent mold.    Do not use products that contain latex if you have a latex allergy. Use nonlatex gloves if you work in healthcare or in food preparation. Always tell healthcare providers about a latex allergy.    Avoid areas that attract insects if you have an insect bite or sting allergy. Areas include trash cans, gardens, and picnics. Do not wear bright clothing or strong scents when you will be outside.    Prevent an allergic reaction caused by food. You may have a reaction if your food is not prepared safely. For example, you could be served food that touched your trigger food during preparation. This is called cross-contamination. Kitchen tools can also cause cross-contamination. You may also eat baked foods that contain a trigger food you do not know about. Ask if the food contains your trigger food before you handle or eat it.  Call 911 for signs or symptoms of anaphylaxis, such as trouble breathing, swelling in your mouth or throat, or wheezing. You may also have itching, a rash, hives, or feel like you are going to faint.    When should I seek immediate care?    You have tingling in your hands or feet.    Your skin is red or flushed.  When should I contact my healthcare provider?    You have questions or concerns about your condition or care.

## 2025-04-06 NOTE — ED ADULT TRIAGE NOTE - CHIEF COMPLAINT QUOTE
Onset this evening, patient states the last thing she ate was green grapes. Endorsed throat/ear itching, red rash on face, neck, hands and trunk. Patient denies chest pain and SOB.

## 2025-04-06 NOTE — ED PROVIDER NOTE - PROGRESS NOTE DETAILS
flu/covid negative. pt reports improvement after treatment here. will send home on solumedrol and pepcid and benadryl.

## 2025-04-06 NOTE — ED PROVIDER NOTE - ATTENDING APP SHARED VISIT CONTRIBUTION OF CARE
Patient with throat itching and skin itching after eating grapes tonight.  No evidence of angioedema on exam.  Treated for allergic reaction with steroids, Benadryl, Pepcid with improvement in symptoms.

## 2025-04-06 NOTE — ED PROVIDER NOTE - CLINICAL SUMMARY MEDICAL DECISION MAKING FREE TEXT BOX
pt here for itchy throat and nasal congestion after eating green grapes tonight. exam found pt well appearing, no wheezing, speaking full sentences. not in acute distress. pharynx is injected. tonsils mildly enlarged but not touching. uvula midline, no drooling.  will give benadryl, pepcid, solu-medrol. fluids. reassess.

## 2025-04-06 NOTE — ED PROVIDER NOTE - OBJECTIVE STATEMENT
30 yo F with no PMH or known allergic reaction c/o nasal congestion and itchiness in throat after eating green grapes at midnight. also having some itchy rash on hands and back. patient then took Zyrtec 10mg with no improvement. denies drooling, difficulty breathing, chest pain, nausea, vomiting, dizziness.

## 2025-04-06 NOTE — ED PROVIDER NOTE - PHYSICAL EXAMINATION
CONSTITUTIONAL: Well-appearing; well-nourished; in no apparent distress. Non-toxic appearing.   NEURO: Alert & oriented. Gait steady without assistance. Sensory and motor functions are grossly intact.  PSYCH: Mood appropriate. Thought processes intact.   NECK: Supple  CARD: Regular rate and rhythm, no murmurs  RESP: No accessory muscle use; breath sounds clear and equal bilaterally; no wheezes, rhonchi, or rales     ABD: Soft; non-distended; non-tender.   MUSCULOSKELETAL/EXTREMITIES: FROM in all four extremities; no extremity edema.  SKIN: Warm; dry; no apparent lesions or exudate  PHARYNX: injected. tonsils mildly enlarged but not touching. uvula midline, no drooling.

## 2025-04-06 NOTE — ED ADULT NURSE NOTE - OBJECTIVE STATEMENT
pt presented complaining of itching w/ hives and difficulty breathing s/p eating green grapes at 14 no hx of allergy

## 2025-04-06 NOTE — ED PROVIDER NOTE - PATIENT PORTAL LINK FT
You can access the FollowMyHealth Patient Portal offered by [facility] by registering at the following website: [email or web address]. By joining [facility]’s FollowMyHealth portal, you will also be able to view your health information using other applications (apps) compatible with our system.

## 2025-04-08 DIAGNOSIS — R21 RASH AND OTHER NONSPECIFIC SKIN ERUPTION: ICD-10-CM

## 2025-04-08 DIAGNOSIS — T78.40XA ALLERGY, UNSPECIFIED, INITIAL ENCOUNTER: ICD-10-CM
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
## 2025-04-08 DIAGNOSIS — R09.81 NASAL CONGESTION: ICD-10-CM

## 2025-04-08 DIAGNOSIS — R07.0 PAIN IN THROAT: ICD-10-CM

## 2025-04-08 DIAGNOSIS — Z91.018 ALLERGY TO OTHER FOODS: ICD-10-CM
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
## 2025-05-20 ENCOUNTER — NON-APPOINTMENT (OUTPATIENT)
Age: 30
End: 2025-05-20